# Patient Record
Sex: FEMALE | Race: BLACK OR AFRICAN AMERICAN | Employment: PART TIME | URBAN - METROPOLITAN AREA
[De-identification: names, ages, dates, MRNs, and addresses within clinical notes are randomized per-mention and may not be internally consistent; named-entity substitution may affect disease eponyms.]

---

## 2018-10-19 ENCOUNTER — APPOINTMENT (OUTPATIENT)
Dept: GENERAL RADIOLOGY | Age: 48
End: 2018-10-19
Payer: MEDICARE

## 2018-10-19 ENCOUNTER — HOSPITAL ENCOUNTER (EMERGENCY)
Age: 48
Discharge: HOME | End: 2018-10-19
Attending: EMERGENCY MEDICINE
Payer: MEDICARE

## 2018-10-19 VITALS
RESPIRATION RATE: 18 BRPM | OXYGEN SATURATION: 99 % | WEIGHT: 217.15 LBS | DIASTOLIC BLOOD PRESSURE: 78 MMHG | HEART RATE: 99 BPM | SYSTOLIC BLOOD PRESSURE: 139 MMHG | TEMPERATURE: 98.5 F

## 2018-10-19 DIAGNOSIS — J40 BRONCHITIS: Primary | ICD-10-CM

## 2018-10-19 LAB
A/G RATIO: 1.1 (ref 1.1–2.2)
ALBUMIN SERPL-MCNC: 3.9 G/DL (ref 3.4–5)
ALP BLD-CCNC: 66 U/L (ref 40–129)
ALT SERPL-CCNC: 10 U/L (ref 10–40)
ANION GAP SERPL CALCULATED.3IONS-SCNC: 14 MMOL/L (ref 3–16)
AST SERPL-CCNC: 20 U/L (ref 15–37)
BASOPHILS ABSOLUTE: 0.1 K/UL (ref 0–0.2)
BASOPHILS RELATIVE PERCENT: 1.5 %
BILIRUB SERPL-MCNC: <0.2 MG/DL (ref 0–1)
BUN BLDV-MCNC: 9 MG/DL (ref 7–20)
CALCIUM SERPL-MCNC: 9 MG/DL (ref 8.3–10.6)
CHLORIDE BLD-SCNC: 104 MMOL/L (ref 99–110)
CO2: 21 MMOL/L (ref 21–32)
CREAT SERPL-MCNC: 0.8 MG/DL (ref 0.6–1.1)
EOSINOPHILS ABSOLUTE: 0.6 K/UL (ref 0–0.6)
EOSINOPHILS RELATIVE PERCENT: 8.9 %
GFR AFRICAN AMERICAN: >60
GFR NON-AFRICAN AMERICAN: >60
GLOBULIN: 3.7 G/DL
GLUCOSE BLD-MCNC: 105 MG/DL (ref 70–99)
HCT VFR BLD CALC: 33.9 % (ref 36–48)
HEMOGLOBIN: 10.9 G/DL (ref 12–16)
LYMPHOCYTES ABSOLUTE: 2.6 K/UL (ref 1–5.1)
LYMPHOCYTES RELATIVE PERCENT: 41.2 %
MCH RBC QN AUTO: 24.5 PG (ref 26–34)
MCHC RBC AUTO-ENTMCNC: 32 G/DL (ref 31–36)
MCV RBC AUTO: 76.5 FL (ref 80–100)
MONOCYTES ABSOLUTE: 0.4 K/UL (ref 0–1.3)
MONOCYTES RELATIVE PERCENT: 6.3 %
NEUTROPHILS ABSOLUTE: 2.6 K/UL (ref 1.7–7.7)
NEUTROPHILS RELATIVE PERCENT: 42.1 %
PDW BLD-RTO: 18.5 % (ref 12.4–15.4)
PLATELET # BLD: 502 K/UL (ref 135–450)
PMV BLD AUTO: 7.4 FL (ref 5–10.5)
POTASSIUM SERPL-SCNC: 4 MMOL/L (ref 3.5–5.1)
RBC # BLD: 4.44 M/UL (ref 4–5.2)
SODIUM BLD-SCNC: 139 MMOL/L (ref 136–145)
TOTAL PROTEIN: 7.6 G/DL (ref 6.4–8.2)
WBC # BLD: 6.2 K/UL (ref 4–11)

## 2018-10-19 PROCEDURE — 96374 THER/PROPH/DIAG INJ IV PUSH: CPT

## 2018-10-19 PROCEDURE — 71045 X-RAY EXAM CHEST 1 VIEW: CPT

## 2018-10-19 PROCEDURE — 94664 DEMO&/EVAL PT USE INHALER: CPT

## 2018-10-19 PROCEDURE — 94640 AIRWAY INHALATION TREATMENT: CPT

## 2018-10-19 PROCEDURE — 99284 EMERGENCY DEPT VISIT MOD MDM: CPT

## 2018-10-19 PROCEDURE — 85025 COMPLETE CBC W/AUTO DIFF WBC: CPT

## 2018-10-19 PROCEDURE — 6370000000 HC RX 637 (ALT 250 FOR IP): Performed by: EMERGENCY MEDICINE

## 2018-10-19 PROCEDURE — 6360000002 HC RX W HCPCS: Performed by: EMERGENCY MEDICINE

## 2018-10-19 PROCEDURE — 80053 COMPREHEN METABOLIC PANEL: CPT

## 2018-10-19 PROCEDURE — 93005 ELECTROCARDIOGRAM TRACING: CPT | Performed by: EMERGENCY MEDICINE

## 2018-10-19 RX ORDER — PREDNISONE 20 MG/1
TABLET ORAL
Qty: 27 TABLET | Refills: 0 | Status: SHIPPED | OUTPATIENT
Start: 2018-10-19 | End: 2021-08-12

## 2018-10-19 RX ORDER — ALBUTEROL SULFATE 90 UG/1
2 AEROSOL, METERED RESPIRATORY (INHALATION) EVERY 4 HOURS PRN
Qty: 1 INHALER | Refills: 1 | Status: SHIPPED | OUTPATIENT
Start: 2018-10-19 | End: 2022-08-29

## 2018-10-19 RX ORDER — AZITHROMYCIN 500 MG/1
500 TABLET, FILM COATED ORAL ONCE
Status: COMPLETED | OUTPATIENT
Start: 2018-10-19 | End: 2018-10-19

## 2018-10-19 RX ORDER — IPRATROPIUM BROMIDE AND ALBUTEROL SULFATE 2.5; .5 MG/3ML; MG/3ML
1 SOLUTION RESPIRATORY (INHALATION) ONCE
Status: COMPLETED | OUTPATIENT
Start: 2018-10-19 | End: 2018-10-19

## 2018-10-19 RX ORDER — AZITHROMYCIN 250 MG/1
TABLET, FILM COATED ORAL
Qty: 4 TABLET | Refills: 0 | Status: SHIPPED | OUTPATIENT
Start: 2018-10-19 | End: 2020-06-03

## 2018-10-19 RX ORDER — DEXAMETHASONE SODIUM PHOSPHATE 4 MG/ML
10 INJECTION, SOLUTION INTRA-ARTICULAR; INTRALESIONAL; INTRAMUSCULAR; INTRAVENOUS; SOFT TISSUE ONCE
Status: COMPLETED | OUTPATIENT
Start: 2018-10-19 | End: 2018-10-19

## 2018-10-19 RX ADMIN — DEXAMETHASONE SODIUM PHOSPHATE 10 MG: 4 INJECTION, SOLUTION INTRAMUSCULAR; INTRAVENOUS at 01:24

## 2018-10-19 RX ADMIN — IPRATROPIUM BROMIDE AND ALBUTEROL SULFATE 1 AMPULE: .5; 3 SOLUTION RESPIRATORY (INHALATION) at 02:04

## 2018-10-19 RX ADMIN — AZITHROMYCIN 500 MG: 500 TABLET, FILM COATED ORAL at 02:57

## 2018-10-19 ASSESSMENT — PAIN SCALES - GENERAL: PAINLEVEL_OUTOF10: 0

## 2018-10-19 NOTE — ED PROVIDER NOTES
Inhale 2 puffs into the lungs every 4 hours as needed for Wheezing or Shortness of Breath With spacer (and mask if indicated). Thanks. 1 Inhaler 1    azithromycin (ZITHROMAX) 250 MG tablet 1 tablet daily starting tomorrow 4 tablet 0     No Known Allergies    Nursing Notes Reviewed    Physical Exam:  ED Triage Vitals   BP Temp Temp src Pulse Resp SpO2 Height Weight   -- -- -- -- -- -- -- --     GENERAL APPEARANCE: Awake and alert. Cooperative. Mild acute distress. HEAD:Normocephalic. Atraumatic. EYES: EOM's grossly intact. Sclera anicteric. ENT: Mucous membranes are moist. Tolerates saliva. No trismus. NECK: Supple. No meningismus. Trachea midline. HEART: RRR. LUNGS: Mild decreased breath sounds bilaterally, with slight exotropia wheezes bilaterally. No rhonchi. ABDOMEN: Soft. Non-tender. No guarding or rebound. EXTREMITIES: No acute deformities. SKIN: Warm and dry. NEUROLOGICAL: No gross facial drooping. Moves all 4 extremitiesspontaneously.     Physical Exam    I have reviewed and interpreted all of the currently availablelab results from this visit (if applicable):  Results for orders placed or performed during the hospital encounter of 10/19/18   CBC Auto Differential   Result Value Ref Range    WBC 6.2 4.0 - 11.0 K/uL    RBC 4.44 4.00 - 5.20 M/uL    Hemoglobin 10.9 (L) 12.0 - 16.0 g/dL    Hematocrit 33.9 (L) 36.0 - 48.0 %    MCV 76.5 (L) 80.0 - 100.0 fL    MCH 24.5 (L) 26.0 - 34.0 pg    MCHC 32.0 31.0 - 36.0 g/dL    RDW 18.5 (H) 12.4 - 15.4 %    Platelets 138 (H) 731 - 450 K/uL    MPV 7.4 5.0 - 10.5 fL    Neutrophils % 42.1 %    Lymphocytes % 41.2 %    Monocytes % 6.3 %    Eosinophils % 8.9 %    Basophils % 1.5 %    Neutrophils # 2.6 1.7 - 7.7 K/uL    Lymphocytes # 2.6 1.0 - 5.1 K/uL    Monocytes # 0.4 0.0 - 1.3 K/uL    Eosinophils # 0.6 0.0 - 0.6 K/uL    Basophils # 0.1 0.0 - 0.2 K/uL   Comprehensive Metabolic Panel   Result Value Ref Range    Sodium 139 136 - 145 mmol/L    Potassium 4.0 3.5 - 5.1

## 2018-10-20 PROCEDURE — 93010 ELECTROCARDIOGRAM REPORT: CPT | Performed by: INTERNAL MEDICINE

## 2018-10-22 LAB
EKG ATRIAL RATE: 127 BPM
EKG DIAGNOSIS: NORMAL
EKG P AXIS: 64 DEGREES
EKG P-R INTERVAL: 112 MS
EKG Q-T INTERVAL: 332 MS
EKG QRS DURATION: 66 MS
EKG QTC CALCULATION (BAZETT): 482 MS
EKG R AXIS: -3 DEGREES
EKG T AXIS: 37 DEGREES
EKG VENTRICULAR RATE: 127 BPM

## 2020-02-21 ENCOUNTER — HOSPITAL ENCOUNTER (EMERGENCY)
Age: 50
Discharge: HOME | End: 2020-02-21
Payer: MEDICARE

## 2020-02-21 ENCOUNTER — APPOINTMENT (OUTPATIENT)
Dept: GENERAL RADIOLOGY | Age: 50
End: 2020-02-21
Payer: MEDICARE

## 2020-02-21 VITALS
SYSTOLIC BLOOD PRESSURE: 184 MMHG | BODY MASS INDEX: 42.03 KG/M2 | DIASTOLIC BLOOD PRESSURE: 106 MMHG | HEART RATE: 102 BPM | RESPIRATION RATE: 17 BRPM | TEMPERATURE: 98.1 F | WEIGHT: 237.22 LBS | HEIGHT: 63 IN | OXYGEN SATURATION: 98 %

## 2020-02-21 PROCEDURE — 99283 EMERGENCY DEPT VISIT LOW MDM: CPT

## 2020-02-21 PROCEDURE — 73560 X-RAY EXAM OF KNEE 1 OR 2: CPT

## 2020-02-21 RX ORDER — MELOXICAM 15 MG/1
15 TABLET ORAL DAILY
Qty: 30 TABLET | Refills: 0 | Status: SHIPPED | OUTPATIENT
Start: 2020-02-21 | End: 2020-06-03

## 2020-02-21 ASSESSMENT — ENCOUNTER SYMPTOMS
ABDOMINAL PAIN: 0
VOMITING: 0
SORE THROAT: 0
NAUSEA: 0
COUGH: 0
COLOR CHANGE: 0
SHORTNESS OF BREATH: 0
WHEEZING: 0
BACK PAIN: 0
DIARRHEA: 0

## 2020-02-21 ASSESSMENT — PAIN DESCRIPTION - LOCATION: LOCATION: KNEE

## 2020-02-21 ASSESSMENT — PAIN DESCRIPTION - DESCRIPTORS: DESCRIPTORS: CONSTANT

## 2020-02-21 ASSESSMENT — PAIN SCALES - GENERAL: PAINLEVEL_OUTOF10: 9

## 2020-02-21 ASSESSMENT — PAIN - FUNCTIONAL ASSESSMENT: PAIN_FUNCTIONAL_ASSESSMENT: PREVENTS OR INTERFERES SOME ACTIVE ACTIVITIES AND ADLS

## 2020-02-21 ASSESSMENT — PAIN DESCRIPTION - ONSET: ONSET: ON-GOING

## 2020-02-21 ASSESSMENT — PAIN DESCRIPTION - ORIENTATION: ORIENTATION: LEFT

## 2020-02-21 ASSESSMENT — PAIN DESCRIPTION - FREQUENCY: FREQUENCY: CONTINUOUS

## 2020-02-21 ASSESSMENT — PAIN DESCRIPTION - PROGRESSION: CLINICAL_PROGRESSION: GRADUALLY WORSENING

## 2020-02-21 ASSESSMENT — PAIN DESCRIPTION - PAIN TYPE: TYPE: ACUTE PAIN

## 2020-02-22 NOTE — ED PROVIDER NOTES
chills and fever. HENT: Negative for congestion and sore throat. Respiratory: Negative for cough, shortness of breath and wheezing. Cardiovascular: Negative for chest pain. Gastrointestinal: Negative for abdominal pain, diarrhea, nausea and vomiting. Genitourinary: Negative for difficulty urinating and dysuria. Musculoskeletal: Positive for arthralgias. Negative for back pain. Patient complains of left knee pain that she woke up this morning with, denies any falls traumas or injuries. Ambulation makes the pain worse. Skin: Negative for color change. Neurological: Negative for weakness, numbness and headaches. Positives and Pertinent negatives as per HPI. Except as noted above in the ROS, problem specific ROS was completed and is negative. Physical Exam:  Physical Exam  Vitals signs and nursing note reviewed. Constitutional:       Appearance: She is well-developed. She is not diaphoretic. HENT:      Head: Normocephalic. Right Ear: External ear normal.      Left Ear: External ear normal.   Eyes:      General:         Right eye: No discharge. Left eye: No discharge. Neck:      Musculoskeletal: Normal range of motion and neck supple. Cardiovascular:      Rate and Rhythm: Regular rhythm. Pulmonary:      Effort: Pulmonary effort is normal. No respiratory distress. Breath sounds: Normal breath sounds. Abdominal:      Palpations: Abdomen is soft. Musculoskeletal: Normal range of motion. General: Tenderness present. Comments: Patient has reproducible tenderness to the subpatellar region of the left knee, no obvious deformity, break in skin integrity, erythema or warmth. She has full extension however flexion elicits pain. There is no palpable crepitus. Negative anterior drawer test.  Compartments of the left leg are soft. Pedal pulses are 2+. Skin:     General: Skin is warm. Capillary Refill: Capillary refill takes less than 2 seconds.

## 2020-06-03 ENCOUNTER — HOSPITAL ENCOUNTER (EMERGENCY)
Age: 50
Discharge: HOME | End: 2020-06-04
Attending: EMERGENCY MEDICINE
Payer: MEDICARE

## 2020-06-03 ENCOUNTER — APPOINTMENT (OUTPATIENT)
Dept: GENERAL RADIOLOGY | Age: 50
End: 2020-06-03
Payer: MEDICARE

## 2020-06-03 LAB
A/G RATIO: 1.2 (ref 1.1–2.2)
ALBUMIN SERPL-MCNC: 4.1 G/DL (ref 3.4–5)
ALP BLD-CCNC: 70 U/L (ref 40–129)
ALT SERPL-CCNC: 10 U/L (ref 10–40)
ANION GAP SERPL CALCULATED.3IONS-SCNC: 12 MMOL/L (ref 3–16)
AST SERPL-CCNC: 23 U/L (ref 15–37)
BASOPHILS ABSOLUTE: 0.1 K/UL (ref 0–0.2)
BASOPHILS RELATIVE PERCENT: 1 %
BILIRUB SERPL-MCNC: <0.2 MG/DL (ref 0–1)
BUN BLDV-MCNC: 14 MG/DL (ref 7–20)
CALCIUM SERPL-MCNC: 9.3 MG/DL (ref 8.3–10.6)
CHLORIDE BLD-SCNC: 105 MMOL/L (ref 99–110)
CO2: 21 MMOL/L (ref 21–32)
CREAT SERPL-MCNC: 0.7 MG/DL (ref 0.6–1.1)
D DIMER: <200 NG/ML DDU (ref 0–229)
EOSINOPHILS ABSOLUTE: 0.7 K/UL (ref 0–0.6)
EOSINOPHILS RELATIVE PERCENT: 9.6 %
GFR AFRICAN AMERICAN: >60
GFR NON-AFRICAN AMERICAN: >60
GLOBULIN: 3.3 G/DL
GLUCOSE BLD-MCNC: 127 MG/DL (ref 70–99)
HCG QUALITATIVE: NEGATIVE
HCT VFR BLD CALC: 36 % (ref 36–48)
HEMOGLOBIN: 11.6 G/DL (ref 12–16)
LYMPHOCYTES ABSOLUTE: 2 K/UL (ref 1–5.1)
LYMPHOCYTES RELATIVE PERCENT: 28.4 %
MCH RBC QN AUTO: 25.2 PG (ref 26–34)
MCHC RBC AUTO-ENTMCNC: 32.2 G/DL (ref 31–36)
MCV RBC AUTO: 78.2 FL (ref 80–100)
MONOCYTES ABSOLUTE: 0.5 K/UL (ref 0–1.3)
MONOCYTES RELATIVE PERCENT: 6.8 %
NEUTROPHILS ABSOLUTE: 3.7 K/UL (ref 1.7–7.7)
NEUTROPHILS RELATIVE PERCENT: 54.2 %
PDW BLD-RTO: 17.7 % (ref 12.4–15.4)
PLATELET # BLD: 428 K/UL (ref 135–450)
PMV BLD AUTO: 7.6 FL (ref 5–10.5)
POTASSIUM REFLEX MAGNESIUM: 5.1 MMOL/L (ref 3.5–5.1)
PRO-BNP: <5 PG/ML (ref 0–124)
RBC # BLD: 4.6 M/UL (ref 4–5.2)
SODIUM BLD-SCNC: 138 MMOL/L (ref 136–145)
TOTAL PROTEIN: 7.4 G/DL (ref 6.4–8.2)
TROPONIN: <0.01 NG/ML
WBC # BLD: 6.9 K/UL (ref 4–11)

## 2020-06-03 PROCEDURE — 85025 COMPLETE CBC W/AUTO DIFF WBC: CPT

## 2020-06-03 PROCEDURE — 85379 FIBRIN DEGRADATION QUANT: CPT

## 2020-06-03 PROCEDURE — 80053 COMPREHEN METABOLIC PANEL: CPT

## 2020-06-03 PROCEDURE — 93005 ELECTROCARDIOGRAM TRACING: CPT | Performed by: EMERGENCY MEDICINE

## 2020-06-03 PROCEDURE — 84443 ASSAY THYROID STIM HORMONE: CPT

## 2020-06-03 PROCEDURE — 99285 EMERGENCY DEPT VISIT HI MDM: CPT

## 2020-06-03 PROCEDURE — 36415 COLL VENOUS BLD VENIPUNCTURE: CPT

## 2020-06-03 PROCEDURE — 84484 ASSAY OF TROPONIN QUANT: CPT

## 2020-06-03 PROCEDURE — 83880 ASSAY OF NATRIURETIC PEPTIDE: CPT

## 2020-06-03 PROCEDURE — 84703 CHORIONIC GONADOTROPIN ASSAY: CPT

## 2020-06-03 ASSESSMENT — PAIN SCALES - GENERAL: PAINLEVEL_OUTOF10: 8

## 2020-06-04 ENCOUNTER — APPOINTMENT (OUTPATIENT)
Dept: GENERAL RADIOLOGY | Age: 50
End: 2020-06-04
Payer: MEDICARE

## 2020-06-04 VITALS
HEART RATE: 106 BPM | SYSTOLIC BLOOD PRESSURE: 138 MMHG | WEIGHT: 237 LBS | BODY MASS INDEX: 41.98 KG/M2 | DIASTOLIC BLOOD PRESSURE: 76 MMHG | RESPIRATION RATE: 20 BRPM | OXYGEN SATURATION: 98 % | TEMPERATURE: 98.8 F

## 2020-06-04 LAB
AMPHETAMINE SCREEN, URINE: ABNORMAL
BARBITURATE SCREEN URINE: ABNORMAL
BENZODIAZEPINE SCREEN, URINE: ABNORMAL
BILIRUBIN URINE: NEGATIVE
BLOOD, URINE: NEGATIVE
CANNABINOID SCREEN URINE: POSITIVE
CLARITY: CLEAR
COCAINE METABOLITE SCREEN URINE: ABNORMAL
COLOR: YELLOW
EKG ATRIAL RATE: 133 BPM
EKG DIAGNOSIS: NORMAL
EKG P AXIS: 45 DEGREES
EKG P-R INTERVAL: 118 MS
EKG Q-T INTERVAL: 306 MS
EKG QRS DURATION: 72 MS
EKG QTC CALCULATION (BAZETT): 455 MS
EKG R AXIS: -13 DEGREES
EKG T AXIS: 27 DEGREES
EKG VENTRICULAR RATE: 133 BPM
EPITHELIAL CELLS, UA: 6 /HPF (ref 0–5)
GLUCOSE URINE: NEGATIVE MG/DL
HYALINE CASTS: 5 /LPF (ref 0–8)
KETONES, URINE: ABNORMAL MG/DL
LEUKOCYTE ESTERASE, URINE: NEGATIVE
Lab: ABNORMAL
METHADONE SCREEN, URINE: ABNORMAL
MICROSCOPIC EXAMINATION: YES
NITRITE, URINE: NEGATIVE
OPIATE SCREEN URINE: ABNORMAL
OXYCODONE URINE: ABNORMAL
PH UA: 6
PH UA: 6 (ref 5–8)
PHENCYCLIDINE SCREEN URINE: ABNORMAL
PROPOXYPHENE SCREEN: ABNORMAL
PROTEIN UA: 30 MG/DL
RBC UA: 2 /HPF (ref 0–4)
SPECIFIC GRAVITY UA: 1.03 (ref 1–1.03)
TSH REFLEX: 2.33 UIU/ML (ref 0.27–4.2)
URINE REFLEX TO CULTURE: ABNORMAL
URINE TYPE: ABNORMAL
UROBILINOGEN, URINE: 1 E.U./DL
WBC UA: 5 /HPF (ref 0–5)

## 2020-06-04 PROCEDURE — 6370000000 HC RX 637 (ALT 250 FOR IP): Performed by: NURSE PRACTITIONER

## 2020-06-04 PROCEDURE — 93010 ELECTROCARDIOGRAM REPORT: CPT | Performed by: INTERNAL MEDICINE

## 2020-06-04 PROCEDURE — 71045 X-RAY EXAM CHEST 1 VIEW: CPT

## 2020-06-04 PROCEDURE — 81001 URINALYSIS AUTO W/SCOPE: CPT

## 2020-06-04 PROCEDURE — 80307 DRUG TEST PRSMV CHEM ANLYZR: CPT

## 2020-06-04 RX ORDER — HYDROXYZINE PAMOATE 25 MG/1
50 CAPSULE ORAL ONCE
Status: COMPLETED | OUTPATIENT
Start: 2020-06-04 | End: 2020-06-04

## 2020-06-04 RX ORDER — HYDROXYZINE HYDROCHLORIDE 25 MG/1
25 TABLET, FILM COATED ORAL EVERY 6 HOURS PRN
Qty: 20 TABLET | Refills: 0 | Status: SHIPPED | OUTPATIENT
Start: 2020-06-04 | End: 2020-06-14

## 2020-06-04 RX ADMIN — HYDROXYZINE PAMOATE 50 MG: 25 CAPSULE ORAL at 01:12

## 2020-06-04 NOTE — ED PROVIDER NOTES
[facility]        Pt Name: Audrey Lopez  MRN: [id number]  [place] 1970  Date of evaluation: 6/3/2020  Provider: JAMIE Arteaga - GEORGE  PCP: Marquise Romero MD     I have seen and evaluated this patient with my supervising physician Dr. Harsha Gonzáles       Chief Complaint   Patient presents with    Palpitations     pt states she started having palpitations and feeling \"a little nauseous\". pt with hx of anxiety states she has been stressed recently and thinks it may be anxiety. HISTORY OF PRESENT ILLNESS   (Location, Timing/Onset, Context/Setting, Quality, Duration, Modifying Factors, Severity, Associated Signs and Symptoms)  Note limiting factors. Audrey Lopez is a 48 y.o. female medical history of HTN, bronchitis and asthma who presents the ED with complaints of palpitations and tachycardia that occurred 1 hour prior to arrival.  Patient says she was watching a movie at the time and started to feel stressed and anxious. She was concerned because a friend had recently  from a PE and she started to think about it got upset. She denies any prior bleeding or clotting disorders, history of PE or DVT, family history of PE or DVT, long distance travels, surgeries, immobilizations, history of cancer. Patient does admit to smoking marijuana 4 hours ago. Says she was diagnosed with hypertension in the past, however has been noncompliant with her medicine as she was in denial.  She denies any history of any thyroid disease. She also has associated nausea she attributed to the anxiety. She is not anticoagulated. She denies associated vomiting, diarrhea, urinary symptoms, abdominal pain, short of air, headache, lightheaded, dizzy, leg swelling, and hemoptysis. Nursing Notes were all reviewed and agreed with or any disagreements were addressed in the HPI.     REVIEW OF SYSTEMS    (2-9 systems for level 4, 10 or more for level 5)     Review of Systems    Positives and Pertinent negatives as per HPI. Except as noted above in the ROS, all other systems were reviewed and negative. PAST MEDICAL HISTORY     Past Medical History:   Diagnosis Date    Asthma          SURGICAL HISTORY     Past Surgical History:   Procedure Laterality Date    ABDOMEN SURGERY           CURRENTMEDICATIONS       Discharge Medication List as of 6/4/2020  3:14 AM      CONTINUE these medications which have NOT CHANGED    Details   NONFORMULARY bp medication-does not know nameHistorical Med      predniSONE (DELTASONE) 20 MG tablet Take 3 tablets daily for 6 days, then 2 tablets daily for 3 days, then 1 tablets daily for 3 days, Disp-27 tablet, R-0Print      albuterol sulfate HFA (PROVENTIL HFA) 108 (90 Base) MCG/ACT inhaler Inhale 2 puffs into the lungs every 4 hours as needed for Wheezing or Shortness of Breath With spacer (and mask if indicated). Thanks. , Disp-1 Inhaler, R-1Print               ALLERGIES     Patient has no known allergies. FAMILYHISTORY     History reviewed. No pertinent family history. SOCIAL HISTORY       Social History     Tobacco Use    Smoking status: Never Smoker    Smokeless tobacco: Never Used   Substance Use Topics    Alcohol use: No    Drug use: Yes     Types: Marijuana       SCREENINGS             PHYSICAL EXAM    (up to 7 for level 4, 8 or more for level 5)     ED Triage Vitals [06/03/20 2216]   BP Temp Temp Source Pulse Resp SpO2 Height Weight   (!) 189/110 98.8 °F (37.1 °C) Oral 127 20 98 % -- 237 lb (107.5 kg)       Physical Exam  Vitals signs and nursing note reviewed. Constitutional:       General: She is awake. Appearance: Normal appearance. She is well-developed. She is obese. HENT:      Head: Normocephalic and atraumatic. Nose: Nose normal.   Eyes:      General:         Right eye: No discharge. Left eye: No discharge.    Neck:      Musculoskeletal: Normal [address]   Phone [phone]   D-DIMER, QUANTITATIVE    Narrative:     Performed at:  [facility] [address], [address]   Phone [phone]   URINE RT REFLEX TO CULTURE   TSH WITH REFLEX   URINE DRUG SCREEN       All other labs were within normal range or not returned as of this dictation. EKG: All EKG's are interpreted by the Emergency Department Physician in the absence of a cardiologist.  Please see their note for interpretation of EKG. RADIOLOGY:   Non-plain film images such as CT, Ultrasound and MRI are read by the radiologist. Plain radiographic images are visualized and preliminarily interpreted by the ED Provider with the below findings:        Interpretation per the Radiologist below, if available at the time of this note:    XR CHEST PORTABLE   Final Result   No radiographic evidence of acute pulmonary disease. No results found. PROCEDURES   Unless otherwise noted below, none     Procedures    CRITICAL CARE TIME   N/A    CONSULTS:  None      EMERGENCY DEPARTMENT COURSE and DIFFERENTIAL DIAGNOSIS/MDM:   Vitals:    Vitals:    06/03/20 2330 06/04/20 0030 06/04/20 0100 06/04/20 0230   BP: (!) 153/70 129/68 (!) 136/53 138/76   Pulse: 118 113 111 106   Resp:       Temp:       TempSrc:       SpO2: 98% 97% 96% 98%   Weight:           Patient was given the following medications:  Medications   hydrOXYzine (VISTARIL) capsule 50 mg (50 mg Oral Given 6/4/20 0112)       Pertinent Labs & Imaging studies reviewed. (See chart for details)   -  Patient seen and evaluated in the emergency department. -  Triage and nursing notes reviewed and incorporated. -  Old chart records reviewed and incorporated. -  Patient case discussed with attending physician,  Dr. Nhung Junior. They saw and examined patient.   -  Differential diagnosis includes:  Pneumonia, MI, anxiety, pneumothorax, pleurisy, ACS, CAD, muscle strain, dehydration, panic attack, PE, thyroid disorder, polysubstance abuse, verse COVID-19  -  Work-up included:  See above CXR, CBC, CMP, troponin, BNP, UA, hCG, d-dimer, TSH, UDS, EKG  -  ED treatment included:   Atarax  - Consults: None  -  Results discussed with patient. Labs show  CBC with hemoglobin 11.6, MCV 78.2, MCH 25.2, RDW 17.7, glucose 127. Troponin negative. BNP less than 5. hCG negative. D-dimer less than 200. TSH pending. UA shows trace ketones with 30 protein. UDS shows positive for cannabinoids. Pt was given strict return precautions. The patient is agreeable with plan of care and disposition.  -  Disposition:  None      FINAL IMPRESSION      1. Palpitations    2. Elevated blood pressure reading    3.  Stress reaction          DISPOSITION/PLAN   DISPOSITION        PATIENT REFERREDTO:  Melba Elmore MD  [address]  [phone]    Call in 2 days  As needed, If symptoms worsen    [facility] Emergency Department  [address] [place] [address]  [phone]  Go to   As needed      DISCHARGE MEDICATIONS:  Discharge Medication List as of 6/4/2020  3:14 AM      START taking these medications    Details   hydrOXYzine (ATARAX) 25 MG tablet Take 1 tablet by mouth every 6 hours as needed for Itching, Disp-20 tablet, R-0Print             DISCONTINUED MEDICATIONS:  Discharge Medication List as of 6/4/2020  3:14 AM      STOP taking these medications       meloxicam (MOBIC) 15 MG tablet Comments:   Reason for Stopping:         azithromycin (ZITHROMAX) 250 MG tablet Comments:   Reason for Stopping:                      (Please note that portions of this note were completed with a voice recognition program.  Efforts were made to edit the dictations but occasionally words are mis-transcribed.)    JAMIE Rushing CNP (electronically signed)            JAMIE Rushing CNP  06/04/20 3886

## 2020-06-05 ENCOUNTER — HOSPITAL ENCOUNTER (EMERGENCY)
Age: 50
Discharge: HOME | End: 2020-06-05
Attending: EMERGENCY MEDICINE
Payer: MEDICARE

## 2020-06-05 ENCOUNTER — APPOINTMENT (OUTPATIENT)
Dept: CT IMAGING | Age: 50
End: 2020-06-05
Payer: MEDICARE

## 2020-06-05 VITALS
TEMPERATURE: 98 F | OXYGEN SATURATION: 97 % | BODY MASS INDEX: 42.68 KG/M2 | HEART RATE: 100 BPM | HEIGHT: 64 IN | WEIGHT: 250 LBS | DIASTOLIC BLOOD PRESSURE: 87 MMHG | RESPIRATION RATE: 17 BRPM | SYSTOLIC BLOOD PRESSURE: 148 MMHG

## 2020-06-05 LAB
A/G RATIO: 1 (ref 1.1–2.2)
ALBUMIN SERPL-MCNC: 3.8 G/DL (ref 3.4–5)
ALP BLD-CCNC: 67 U/L (ref 40–129)
ALT SERPL-CCNC: 8 U/L (ref 10–40)
ANION GAP SERPL CALCULATED.3IONS-SCNC: 10 MMOL/L (ref 3–16)
AST SERPL-CCNC: 13 U/L (ref 15–37)
BASOPHILS ABSOLUTE: 0.1 K/UL (ref 0–0.2)
BASOPHILS RELATIVE PERCENT: 0.9 %
BILIRUB SERPL-MCNC: <0.2 MG/DL (ref 0–1)
BUN BLDV-MCNC: 13 MG/DL (ref 7–20)
CALCIUM SERPL-MCNC: 9.5 MG/DL (ref 8.3–10.6)
CHLORIDE BLD-SCNC: 105 MMOL/L (ref 99–110)
CO2: 23 MMOL/L (ref 21–32)
CREAT SERPL-MCNC: 0.6 MG/DL (ref 0.6–1.1)
EKG ATRIAL RATE: 128 BPM
EKG DIAGNOSIS: NORMAL
EKG P AXIS: 49 DEGREES
EKG P-R INTERVAL: 130 MS
EKG Q-T INTERVAL: 320 MS
EKG QRS DURATION: 76 MS
EKG QTC CALCULATION (BAZETT): 467 MS
EKG R AXIS: -9 DEGREES
EKG T AXIS: 20 DEGREES
EKG VENTRICULAR RATE: 128 BPM
EOSINOPHILS ABSOLUTE: 0.5 K/UL (ref 0–0.6)
EOSINOPHILS RELATIVE PERCENT: 8.2 %
GFR AFRICAN AMERICAN: >60
GFR NON-AFRICAN AMERICAN: >60
GLOBULIN: 3.8 G/DL
GLUCOSE BLD-MCNC: 112 MG/DL (ref 70–99)
HCG QUALITATIVE: NEGATIVE
HCT VFR BLD CALC: 36.1 % (ref 36–48)
HEMOGLOBIN: 11.8 G/DL (ref 12–16)
LYMPHOCYTES ABSOLUTE: 2 K/UL (ref 1–5.1)
LYMPHOCYTES RELATIVE PERCENT: 30.4 %
MCH RBC QN AUTO: 25.8 PG (ref 26–34)
MCHC RBC AUTO-ENTMCNC: 32.7 G/DL (ref 31–36)
MCV RBC AUTO: 78.9 FL (ref 80–100)
MONOCYTES ABSOLUTE: 0.4 K/UL (ref 0–1.3)
MONOCYTES RELATIVE PERCENT: 6.1 %
NEUTROPHILS ABSOLUTE: 3.5 K/UL (ref 1.7–7.7)
NEUTROPHILS RELATIVE PERCENT: 54.4 %
PDW BLD-RTO: 17.2 % (ref 12.4–15.4)
PLATELET # BLD: 417 K/UL (ref 135–450)
PMV BLD AUTO: 7.7 FL (ref 5–10.5)
POTASSIUM SERPL-SCNC: 4.4 MMOL/L (ref 3.5–5.1)
PRO-BNP: 8 PG/ML (ref 0–124)
RBC # BLD: 4.58 M/UL (ref 4–5.2)
SODIUM BLD-SCNC: 138 MMOL/L (ref 136–145)
TOTAL PROTEIN: 7.6 G/DL (ref 6.4–8.2)
TROPONIN: <0.01 NG/ML
WBC # BLD: 6.4 K/UL (ref 4–11)

## 2020-06-05 PROCEDURE — 96374 THER/PROPH/DIAG INJ IV PUSH: CPT

## 2020-06-05 PROCEDURE — 84703 CHORIONIC GONADOTROPIN ASSAY: CPT

## 2020-06-05 PROCEDURE — 2580000003 HC RX 258: Performed by: PHYSICIAN ASSISTANT

## 2020-06-05 PROCEDURE — 83880 ASSAY OF NATRIURETIC PEPTIDE: CPT

## 2020-06-05 PROCEDURE — 93010 ELECTROCARDIOGRAM REPORT: CPT | Performed by: INTERNAL MEDICINE

## 2020-06-05 PROCEDURE — 6360000004 HC RX CONTRAST MEDICATION: Performed by: PHYSICIAN ASSISTANT

## 2020-06-05 PROCEDURE — 93005 ELECTROCARDIOGRAM TRACING: CPT | Performed by: EMERGENCY MEDICINE

## 2020-06-05 PROCEDURE — 2580000003 HC RX 258: Performed by: EMERGENCY MEDICINE

## 2020-06-05 PROCEDURE — 84484 ASSAY OF TROPONIN QUANT: CPT

## 2020-06-05 PROCEDURE — 71260 CT THORAX DX C+: CPT

## 2020-06-05 PROCEDURE — 6360000002 HC RX W HCPCS: Performed by: PHYSICIAN ASSISTANT

## 2020-06-05 PROCEDURE — 99284 EMERGENCY DEPT VISIT MOD MDM: CPT

## 2020-06-05 PROCEDURE — 85025 COMPLETE CBC W/AUTO DIFF WBC: CPT

## 2020-06-05 PROCEDURE — 80053 COMPREHEN METABOLIC PANEL: CPT

## 2020-06-05 RX ORDER — FAMOTIDINE 20 MG/1
20 TABLET, FILM COATED ORAL 2 TIMES DAILY
Qty: 60 TABLET | Refills: 0 | Status: SHIPPED | OUTPATIENT
Start: 2020-06-05

## 2020-06-05 RX ORDER — 0.9 % SODIUM CHLORIDE 0.9 %
1000 INTRAVENOUS SOLUTION INTRAVENOUS ONCE
Status: COMPLETED | OUTPATIENT
Start: 2020-06-05 | End: 2020-06-05

## 2020-06-05 RX ORDER — LORAZEPAM 2 MG/ML
1 INJECTION INTRAMUSCULAR ONCE
Status: COMPLETED | OUTPATIENT
Start: 2020-06-05 | End: 2020-06-05

## 2020-06-05 RX ADMIN — SODIUM CHLORIDE 1000 ML: 9 INJECTION, SOLUTION INTRAVENOUS at 10:30

## 2020-06-05 RX ADMIN — IOPAMIDOL 75 ML: 755 INJECTION, SOLUTION INTRAVENOUS at 09:31

## 2020-06-05 RX ADMIN — LORAZEPAM 1 MG: 2 INJECTION INTRAMUSCULAR; INTRAVENOUS at 08:41

## 2020-06-05 RX ADMIN — SODIUM CHLORIDE 1000 ML: 9 INJECTION, SOLUTION INTRAVENOUS at 08:40

## 2020-06-05 ASSESSMENT — ENCOUNTER SYMPTOMS
RHINORRHEA: 0
DIARRHEA: 0
ABDOMINAL PAIN: 0
SHORTNESS OF BREATH: 1
NAUSEA: 0
VOMITING: 0
COUGH: 0

## 2020-06-05 ASSESSMENT — PAIN SCALES - GENERAL: PAINLEVEL_OUTOF10: 5

## 2020-06-05 NOTE — ED PROVIDER NOTES
[facility]        Pt Name: Sindi Willard  MRN: [id number]  [place] 1970  Date of evaluation: 6/5/2020  Provider: Anju Emmanuel PA-C  PCP: Perla Conroy MD     I have seen and evaluated this patient with my supervising physician Murphy Mason, *. CHIEF COMPLAINT       Chief Complaint   Patient presents with    Chest Pain     states she woke with pain under left breast around 0720 today. hr 129 and bp 210/90 on arrival.  States she hasn't been taking her bp meds regularly. Here a few days ago for similar symptoms. HISTORY OF PRESENT ILLNESS   (Location, Timing/Onset, Context/Setting, Quality, Duration, Modifying Factors, Severity, Associated Signs and Symptoms)  Note limiting factors. Sindi Willard is a 48 y.o. female who presents to the emergency department today for evaluation for palpitations, chest pain and shortness of breath. The patient was actually seen in the emergency room 2 days ago for similar symptoms. The patient is very tearful when she arrives to the ED today she states that she is under a lot of stress and she is going through a lot. She tells me that she recently had a young friend die of a pulmonary embolism, and the patient is concerned that she could have 1 of these. In review of the patient's chart, her d-dimer was negative, her troponin was negative. The patient was given a prescription for Vistaril. The patient states that she has had a lot on her mind, and she states that she has been worried quite a bit. She feels that this is likely the cause of her symptoms. She states that earlier this morning she was lying in bed and she had a cramping sensation to her left upper chest, with associated palpitations and shortness of breath. The patient states that the pain has since resolved, and she states that all of her pain occurred while lying in bed.   The patient has a history of for evaluation for palpitations, chest pain and shortness of breath. The patient was actually seen in the emergency room 2 days ago for similar symptoms. The patient is very tearful when she arrives to the ED today she states that she is under a lot of stress and she is going through a lot. She tells me that she recently had a young friend die of a pulmonary embolism, and the patient is concerned that she could have 1 of these. In review of the patient's chart, her d-dimer was negative, her troponin was negative. The patient was given a prescription for Vistaril. The patient states that she has had a lot on her mind, and she states that she has been worried quite a bit. She feels that this is likely the cause of her symptoms. She states that earlier this morning she was lying in bed and she had a cramping sensation to her left upper chest, with associated palpitations and shortness of breath. The patient states that the pain has since resolved, and she states that all of her pain occurred while lying in bed. The patient has a history of hypertension although she did not take her medications today. She has no history of hyperlipidemia or diabetes. Non-smoker. She does smoke marijuana occasionally. Patient states that she believes that her mother had a blockage in her heart but she is unsure. The patient denies any history of DVT or PE. No estrogen therapies. She states that she has been having some intermittent leg cramping but denies any leg cramping to me at this time. She is no lower leg pain or swelling at this time. She tells me that she recently had a car ride to [place] and [facility]. She is not had any illnesses including fever, cough, vomiting or diarrhea. No urinary symptoms. The patient is very tearful, and she feels that most of her symptoms today are due to stress.     Physical exam, she is very tearful, does appear to be anxious on exam.  She is tachycardic with a rate of 121 of my

## 2020-06-05 NOTE — ED NOTES
Pt alert and oriented, Pt to ER via squad with c/o sudden CP and palpitations while sleeping last night, states was here just a few days ago for the same exact thing, states son called 26/ pt denies cardiac hx, states was recently started on BP meds for hypertension but does not take them. Pt states \"I swear this feels like anxiety. \" pt very anxious, tearful and requesting writer stay at bedside because \"I feel so much better when i'm not alone. \" Pt tachycardic, heart rate regular, S1, S2 heard. Cap refill less than three seconds, radial pulse 2+, no signs of edema or JVD and lungs sounds clear. Pt states pain 5/10 at this time. Pt denies any other problems or needs at this time.      Lyssa Abreu RN  06/05/20 [id number]

## 2020-06-05 NOTE — ED NOTES
Pt updated on plan of care by heather mccarthy. Patient resting comfortably with no signs of distress. Denies any needs at this time. Bed locked and in lowest position with both side rails raised. Call light within reach.      Tato Ritchie RN  06/05/20 3838

## 2020-06-05 NOTE — ED PROVIDER NOTES
perfusion, no edema    Medical Decision Making and Plan: Briefly, this is an 48 y. o.female who presented with chest discomfort. Reports anxiety, stress over death of her friend. The patient's history and evaluation suggests a less emergent etiology for the discomfort. We will initiate pepcid for esophagitis. We do not believe the patient is experiencing symptoms from acute coronary syndrome, aortic dissection, pulmonary embolism, pneumothorax, myocarditis, Boerhaave syndrome, pericardial tamponade, acute abdomen, amongst other emergencies. Dorethia Favre was given appropriate discharge instructions. Referral to follow up provider. For further details of [facility] encounter, please see documentation by advanced practice provider MACKENZIE Leon.     Labs Reviewed   CBC WITH AUTO DIFFERENTIAL - Abnormal; Notable for the following components:       Result Value    Hemoglobin 11.8 (*)     MCV 78.9 (*)     MCH 25.8 (*)     RDW 17.2 (*)     All other components within normal limits    Narrative:     Performed at:  [facility] [address],  [facility], [address]   Phone [phone]   COMPREHENSIVE METABOLIC PANEL - Abnormal; Notable for the following components:    Glucose 112 (*)     Albumin/Globulin Ratio 1.0 (*)     ALT 8 (*)     AST 13 (*)     All other components within normal limits    Narrative:     Performed at:  [facility] [address],  [facility], [address]   Phone [phone]   TROPONIN    Narrative:     Performed at:  [facility] [address],  [facility], [address]   Phone [phone]    Narrative:     Performed at:  [facility] [address],  [facility], [address]   Phone [phone]   HCG, SERUM, QUALITATIVE    Narrative:     Performed at:  [facility]  [address]

## 2020-07-03 ENCOUNTER — APPOINTMENT (OUTPATIENT)
Dept: CT IMAGING | Age: 50
End: 2020-07-03
Payer: MEDICARE

## 2020-07-03 ENCOUNTER — APPOINTMENT (OUTPATIENT)
Dept: GENERAL RADIOLOGY | Age: 50
End: 2020-07-03
Payer: MEDICARE

## 2020-07-03 ENCOUNTER — HOSPITAL ENCOUNTER (EMERGENCY)
Age: 50
Discharge: HOME | End: 2020-07-03
Payer: MEDICARE

## 2020-07-03 VITALS
HEART RATE: 98 BPM | SYSTOLIC BLOOD PRESSURE: 138 MMHG | BODY MASS INDEX: 39.05 KG/M2 | RESPIRATION RATE: 17 BRPM | OXYGEN SATURATION: 100 % | WEIGHT: 227.51 LBS | DIASTOLIC BLOOD PRESSURE: 76 MMHG | TEMPERATURE: 98 F

## 2020-07-03 LAB
A/G RATIO: 1 (ref 1.1–2.2)
ALBUMIN SERPL-MCNC: 4.5 G/DL (ref 3.4–5)
ALP BLD-CCNC: 84 U/L (ref 40–129)
ALT SERPL-CCNC: 8 U/L (ref 10–40)
ANION GAP SERPL CALCULATED.3IONS-SCNC: 15 MMOL/L (ref 3–16)
AST SERPL-CCNC: 16 U/L (ref 15–37)
BASOPHILS ABSOLUTE: 0.1 K/UL (ref 0–0.2)
BASOPHILS RELATIVE PERCENT: 1 %
BILIRUB SERPL-MCNC: 0.4 MG/DL (ref 0–1)
BILIRUBIN URINE: NEGATIVE
BLOOD, URINE: NEGATIVE
BUN BLDV-MCNC: 14 MG/DL (ref 7–20)
CALCIUM SERPL-MCNC: 9.7 MG/DL (ref 8.3–10.6)
CHLORIDE BLD-SCNC: 94 MMOL/L (ref 99–110)
CLARITY: ABNORMAL
CO2: 20 MMOL/L (ref 21–32)
COLOR: YELLOW
CREAT SERPL-MCNC: 0.9 MG/DL (ref 0.6–1.1)
EOSINOPHILS ABSOLUTE: 0.1 K/UL (ref 0–0.6)
EOSINOPHILS RELATIVE PERCENT: 1.3 %
EPITHELIAL CELLS, UA: 12 /HPF (ref 0–5)
GFR AFRICAN AMERICAN: >60
GFR NON-AFRICAN AMERICAN: >60
GLOBULIN: 4.5 G/DL
GLUCOSE BLD-MCNC: 96 MG/DL (ref 70–99)
GLUCOSE URINE: NEGATIVE MG/DL
HCG(URINE) PREGNANCY TEST: NEGATIVE
HCT VFR BLD CALC: 41.8 % (ref 36–48)
HEMOGLOBIN: 13.7 G/DL (ref 12–16)
HYALINE CASTS: 19 /LPF (ref 0–8)
KETONES, URINE: ABNORMAL MG/DL
LEUKOCYTE ESTERASE, URINE: NEGATIVE
LIPASE: 32 U/L (ref 13–60)
LYMPHOCYTES ABSOLUTE: 2.4 K/UL (ref 1–5.1)
LYMPHOCYTES RELATIVE PERCENT: 22.7 %
MCH RBC QN AUTO: 26.2 PG (ref 26–34)
MCHC RBC AUTO-ENTMCNC: 32.8 G/DL (ref 31–36)
MCV RBC AUTO: 79.7 FL (ref 80–100)
MICROSCOPIC EXAMINATION: YES
MONOCYTES ABSOLUTE: 0.7 K/UL (ref 0–1.3)
MONOCYTES RELATIVE PERCENT: 6.9 %
NEUTROPHILS ABSOLUTE: 7.3 K/UL (ref 1.7–7.7)
NEUTROPHILS RELATIVE PERCENT: 68.1 %
NITRITE, URINE: NEGATIVE
PDW BLD-RTO: 16.2 % (ref 12.4–15.4)
PH UA: 6 (ref 5–8)
PLATELET # BLD: 523 K/UL (ref 135–450)
PMV BLD AUTO: 7.5 FL (ref 5–10.5)
POTASSIUM REFLEX MAGNESIUM: 3.9 MMOL/L (ref 3.5–5.1)
PROTEIN UA: 100 MG/DL
RBC # BLD: 5.25 M/UL (ref 4–5.2)
RBC UA: 3 /HPF (ref 0–4)
SODIUM BLD-SCNC: 129 MMOL/L (ref 136–145)
SPECIFIC GRAVITY UA: 1.02 (ref 1–1.03)
TOTAL PROTEIN: 9 G/DL (ref 6.4–8.2)
TROPONIN: <0.01 NG/ML
URINE REFLEX TO CULTURE: ABNORMAL
URINE TYPE: ABNORMAL
UROBILINOGEN, URINE: 0.2 E.U./DL
WBC # BLD: 10.7 K/UL (ref 4–11)
WBC UA: 7 /HPF (ref 0–5)

## 2020-07-03 PROCEDURE — 83690 ASSAY OF LIPASE: CPT

## 2020-07-03 PROCEDURE — 93005 ELECTROCARDIOGRAM TRACING: CPT | Performed by: PHYSICIAN ASSISTANT

## 2020-07-03 PROCEDURE — 71260 CT THORAX DX C+: CPT

## 2020-07-03 PROCEDURE — 99284 EMERGENCY DEPT VISIT MOD MDM: CPT

## 2020-07-03 PROCEDURE — 81001 URINALYSIS AUTO W/SCOPE: CPT

## 2020-07-03 PROCEDURE — 80053 COMPREHEN METABOLIC PANEL: CPT

## 2020-07-03 PROCEDURE — 6360000004 HC RX CONTRAST MEDICATION: Performed by: PHYSICIAN ASSISTANT

## 2020-07-03 PROCEDURE — 71046 X-RAY EXAM CHEST 2 VIEWS: CPT

## 2020-07-03 PROCEDURE — 2580000003 HC RX 258: Performed by: PHYSICIAN ASSISTANT

## 2020-07-03 PROCEDURE — 96361 HYDRATE IV INFUSION ADD-ON: CPT

## 2020-07-03 PROCEDURE — 84703 CHORIONIC GONADOTROPIN ASSAY: CPT

## 2020-07-03 PROCEDURE — 96360 HYDRATION IV INFUSION INIT: CPT

## 2020-07-03 PROCEDURE — 84484 ASSAY OF TROPONIN QUANT: CPT

## 2020-07-03 PROCEDURE — 85025 COMPLETE CBC W/AUTO DIFF WBC: CPT

## 2020-07-03 RX ORDER — 0.9 % SODIUM CHLORIDE 0.9 %
1000 INTRAVENOUS SOLUTION INTRAVENOUS ONCE
Status: COMPLETED | OUTPATIENT
Start: 2020-07-03 | End: 2020-07-03

## 2020-07-03 RX ADMIN — SODIUM CHLORIDE 1000 ML: 9 INJECTION, SOLUTION INTRAVENOUS at 19:58

## 2020-07-03 RX ADMIN — SODIUM CHLORIDE 1000 ML: 9 INJECTION, SOLUTION INTRAVENOUS at 18:36

## 2020-07-03 RX ADMIN — IOPAMIDOL 75 ML: 755 INJECTION, SOLUTION INTRAVENOUS at 19:46

## 2020-07-03 ASSESSMENT — ENCOUNTER SYMPTOMS
VOMITING: 0
SHORTNESS OF BREATH: 0
CHEST TIGHTNESS: 0
ABDOMINAL PAIN: 1
NAUSEA: 0
DIARRHEA: 0

## 2020-07-03 ASSESSMENT — PAIN DESCRIPTION - ONSET: ONSET: ON-GOING

## 2020-07-03 ASSESSMENT — PAIN SCALES - GENERAL: PAINLEVEL_OUTOF10: 5

## 2020-07-03 ASSESSMENT — PAIN DESCRIPTION - PROGRESSION: CLINICAL_PROGRESSION: NOT CHANGED

## 2020-07-03 ASSESSMENT — PAIN DESCRIPTION - LOCATION: LOCATION: BACK;ABDOMEN

## 2020-07-03 ASSESSMENT — PAIN - FUNCTIONAL ASSESSMENT: PAIN_FUNCTIONAL_ASSESSMENT: ACTIVITIES ARE NOT PREVENTED

## 2020-07-03 ASSESSMENT — PAIN DESCRIPTION - PAIN TYPE: TYPE: ACUTE PAIN

## 2020-07-03 ASSESSMENT — PAIN DESCRIPTION - DESCRIPTORS: DESCRIPTORS: CRAMPING

## 2020-07-03 ASSESSMENT — PAIN DESCRIPTION - ORIENTATION: ORIENTATION: LOWER;MID

## 2020-07-03 ASSESSMENT — PAIN DESCRIPTION - FREQUENCY: FREQUENCY: INTERMITTENT

## 2020-07-03 NOTE — ED TRIAGE NOTES
Pt states she was at work and started having cramps/geovanny horses in her abdomen, she has a hx of this but this time was more painful, she states she had to lie down from the pain. Denies NVD.

## 2020-07-03 NOTE — ED PROVIDER NOTES
EKG interpretation    Sinus tachycardia rate of 110 age-indeterminate inferior infarct otherwise abnormal EKG          Jimbo Tamez MD  07/03/20 3441

## 2020-07-04 NOTE — ED PROVIDER NOTES
[facility] EMERGENCY DEPARTMENT  [address] [address]  Dept: [phone]  Loc: [phone]  eMERGENCYdEPARTMENT eNCOUnter      Pt Name: Jamia Nevarez  MRN: [id number]  [place] 1970  Date of evaluation: 7/3/2020  Provider:Inga Claire PA-C    CHIEF COMPLAINT       Chief Complaint   Patient presents with    Abdominal Cramping     Pt states she was at work and started having cramps/geovanny horses in her abdomen, she has a hx of this but this time was more painful, she states she had to lie down from the pain. Denies NVD. HISTORY OF PRESENT ILLNESS  (Location/Symptom, Timing/Onset, Context/Setting, Quality, Duration,Modifying Factors, Severity.)   Jamia Nevarez is a 48 y.o. female who presents to the emergency department by EMS complaining of cramping. Patient states at work today she began having cramping and spasming throughout abdomen. Spasms will move to various places throughout her abdomen. States as she began to have spasms she laid on the floor at work. Ice was applied to the affected regions with improvement. Patient was initially able to continue to work, but then symptoms worsened. She began to have increasing spasms throughout her lower abdomen. At this time pain became unbearable. Patient states pain was causing her to feel slightly lightheaded. EMS was called at that time. Patient denies any chest pain, shortness of breath, syncope. Denies any nausea, vomiting, diarrhea. She has a past medical history of asthma and anxiety. No other complaints this time. Nursing Notes were reviewedand agreed with or any disagreements were addressed in the HPI. REVIEW OF SYSTEMS    (2-9 systems for level 4, 10 or more for level 5)     Review of Systems   Constitutional: Negative for chills and fever. HENT: Negative. Respiratory: Negative for chest tightness and shortness of breath.     Cardiovascular: Positive for palpitations. Negative for chest pain. Gastrointestinal: Positive for abdominal pain. Negative for diarrhea, nausea and vomiting. Genitourinary: Negative. Musculoskeletal: Positive for myalgias. Negative for arthralgias. Neurological: Negative for dizziness and headaches. Psychiatric/Behavioral: Negative for behavioral problems and confusion. Except as noted above the remainder of the review of systems was reviewed and negative. PAST MEDICAL HISTORY         Diagnosis Date    Asthma        SURGICAL HISTORY           Procedure Laterality Date    ABDOMEN SURGERY         CURRENT MEDICATIONS     [unfilled]    ALLERGIES     Patient has no known allergies. FAMILY HISTORY     History reviewed. No pertinent family history. No family status information on file. SOCIAL HISTORY      reports that she has never smoked. She has never used smokeless tobacco. She reports current drug use. Drug: Marijuana. She reports that she does not drink alcohol. PHYSICAL EXAM    (up to 7 for level 4, 8 or more for level 5)     ED Triage Vitals [07/03/20 1714]   Enc Vitals Group      BP (!) 140/74      Pulse 111      Resp 20      Temp 98.9 °F (37.2 °C)      Temp Source Oral      SpO2 99 %      Weight 227 lb 8.2 oz (103.2 kg)      Height       Head Circumference       Peak Flow       Pain Score       Pain Loc       Pain Edu? Excl. in [address]? Physical Exam  Vitals signs reviewed. Constitutional:       Comments: Uncomfortable appearing   HENT:      Head: Normocephalic and atraumatic. Neck:      Musculoskeletal: Normal range of motion and neck supple. Cardiovascular:      Rate and Rhythm: Regular rhythm. Tachycardia present. Pulmonary:      Effort: Pulmonary effort is normal. No respiratory distress. Breath sounds: Normal breath sounds. Abdominal:      Palpations: Abdomen is soft. Tenderness: There is no abdominal tenderness. There is no guarding or rebound.    Musculoskeletal: Normal range of motion. Skin:     General: Skin is warm. Neurological:      General: No focal deficit present. Mental Status: She is alert and oriented to person, place, and time. Psychiatric:         Mood and Affect: Mood normal.         Behavior: Behavior normal.           DIAGNOSTIC RESULTS     EKG: All EKG's are interpreted by the Emergency Department Physician who either signs or Co-signs this chart in the absence of a cardiologist.    RADIOLOGY:   Non-plain film images such as CT, Ultrasound and MRI are read by the radiologist. Plain radiographic images are visualized and preliminarilyinterpreted by the emergency physician with the below findings:    Interpretation per the Radiologist below,if available at the time of this note:    CT CHEST PULMONARY EMBOLISM W CONTRAST   Final Result   Limited evaluation of the segmental and subsegmental pulmonary arterial   branches. No gross central pulmonary embolism is detected. There is again noted to be moderate to marked mural thickening of the mid to   lower esophagus, which again suggests esophagitis. Overall, that is similar   to decreased when compared to the previous exam.  That should be followed to   resolution. Multiple low-density lesions within the spleen, nonspecific. Correlate with   any clinical history of neoplasm. If there is a history of neoplasm, further   evaluation with PET-CT or MRI is recommended. There is no history of   neoplasm follow-up MRI in 6-12 months is recommended to ensure stability.          XR CHEST STANDARD (2 VW)   Final Result   No evidence of acute cardiopulmonary disease               LABS:  Labs Reviewed   CBC WITH AUTO DIFFERENTIAL - Abnormal; Notable for the following components:       Result Value    RBC 5.25 (*)     MCV 79.7 (*)     RDW 16.2 (*)     Platelets 950 (*)     All other components within normal limits    Narrative:     Performed at:  [facility]  [facility] [place]   Phone [phone]   COMPREHENSIVE METABOLIC PANEL W/ REFLEX TO MG FOR LOW K - Abnormal; Notable for the following components:    Sodium 129 (*)     Chloride 94 (*)     CO2 20 (*)     Total Protein 9.0 (*)     Albumin/Globulin Ratio 1.0 (*)     ALT 8 (*)     All other components within normal limits    Narrative:     Performed at:  [facility]  [address] [facility] [place]   Phone [phone]   URINE RT REFLEX TO CULTURE - Abnormal; Notable for the following components:    Clarity, UA CLOUDY (*)     Ketones, Urine TRACE (*)     Protein,  (*)     All other components within normal limits    Narrative:     Performed at:  [address] [place]   Phone [phone]   MICROSCOPIC URINALYSIS - Abnormal; Notable for the following components:    Hyaline Casts, UA 19 (*)     WBC, UA 7 (*)     Epithelial Cells, UA 12 (*)     All other components within normal limits    Narrative:     Performed at:  [facility]  [address] [facility] [place]   Phone [phone]   LIPASE    Narrative:     Performed at:  [facility]  [address] [place] [address] [place]   Phone [phone]   PREGNANCY, URINE    Narrative:     Performed at:  [address] [facility] [place]   Phone [phone]   TROPONIN    Narrative:     Performed at:  [facility]  [address] [place] [address] [facility]   Phone [phone]       All other labs were within normal range or not returned as of this dictation.     EMERGENCY DEPARTMENT COURSE and DIFFERENTIAL DIAGNOSIS/MDM:   Vitals:    Vitals:    07/03/20 1714 07/03/20 1845 07/03/20 2108 07/03/20 2130   BP: (!) 140/74 (!) 162/89  138/76   Pulse: 111 106 101 98   Resp: 20   17 Temp: 98.9 °F (37.2 °C)   98 °F (36.7 °C)   TempSrc: Oral   Oral   SpO2: 99% 99%  100%   Weight: 227 lb 8.2 oz (103.2 kg)          MDM     Patient presents ED with HPI noted above. Patient tachycardic and uncomfortable appearing upon arrival.    Physical exam as above. Regarding cramping, CMP showed mild hyponatremia with a sodium 129, CO2 20, no potassium or calcium abnormality. Again as discussed cramping resolved with IV fluid. In re-discussion with patient, this has been an ongoing issue for over 10 years. Discussed with patient I do not have a definitive answer regarding cramping and spasms at this time. Encourage patient to continue hydrating at home. Urine did show trace ketones. She voiced understanding. Imaging held of abdomen is abdomen soft nontender throughout and this has been ongoing issue for 10 years. Patient initially extremely uncomfortable given cramping. It was difficult to obtain a thorough HPI at initial evaluation given discomfort. She is given 1 L IV fluid. At reevaluation she voiced complete resolution of pain. Patient no longer having cramping which is moving throughout her body. Rediscussed HPI at this time. Patient had cramping scattered throughout her abdomen as well as her left lower leg. At no point did she have any chest pain or true shortness of breath. Given initial presentation, tachycardia and discomfort did do a CTA of chest.  CTA showed no central PE. After rediscussion with patient I do not suspect pulmonary embolism. She has no hx of PE/DVT, no calf pain or swelling, no hemoptysis. Patient has had tachycardia documented in all previous evaluations in the past several months. Again she has not been hypoxic throughout duration of stay in the ED here and has no shortness of breath or chest pain. She is had some intermittent palpitations, denies any currently. She was initially extremely anxious, has struggled with anxiety.   She denies being worked up a voice recognition program.  Efforts were made to edit the dictations but occasionally words are mis-transcribed.)    [address], PA-C         [facility] [facility]  07/03/20 7130

## 2020-07-06 LAB
EKG ATRIAL RATE: 110 BPM
EKG DIAGNOSIS: NORMAL
EKG P AXIS: 59 DEGREES
EKG P-R INTERVAL: 146 MS
EKG Q-T INTERVAL: 328 MS
EKG QRS DURATION: 64 MS
EKG QTC CALCULATION (BAZETT): 443 MS
EKG R AXIS: -17 DEGREES
EKG T AXIS: -1 DEGREES
EKG VENTRICULAR RATE: 110 BPM

## 2020-07-06 PROCEDURE — 93010 ELECTROCARDIOGRAM REPORT: CPT | Performed by: INTERNAL MEDICINE

## 2021-01-12 ENCOUNTER — HOSPITAL ENCOUNTER (EMERGENCY)
Age: 51
Discharge: HOME | End: 2021-01-12
Payer: MEDICARE

## 2021-01-12 ENCOUNTER — APPOINTMENT (OUTPATIENT)
Dept: GENERAL RADIOLOGY | Age: 51
End: 2021-01-12
Payer: MEDICARE

## 2021-01-12 VITALS
OXYGEN SATURATION: 100 % | SYSTOLIC BLOOD PRESSURE: 176 MMHG | RESPIRATION RATE: 16 BRPM | DIASTOLIC BLOOD PRESSURE: 101 MMHG | BODY MASS INDEX: 39.05 KG/M2 | HEART RATE: 110 BPM | TEMPERATURE: 98.1 F | HEIGHT: 64 IN

## 2021-01-12 DIAGNOSIS — R00.0 TACHYCARDIA: ICD-10-CM

## 2021-01-12 DIAGNOSIS — I10 UNCONTROLLED HYPERTENSION: ICD-10-CM

## 2021-01-12 DIAGNOSIS — S43.101A AC SEPARATION, RIGHT, INITIAL ENCOUNTER: Primary | ICD-10-CM
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
## 2021-01-12 PROCEDURE — 6370000000 HC RX 637 (ALT 250 FOR IP): Performed by: PHYSICIAN ASSISTANT

## 2021-01-12 PROCEDURE — 73130 X-RAY EXAM OF HAND: CPT

## 2021-01-12 PROCEDURE — 99284 EMERGENCY DEPT VISIT MOD MDM: CPT

## 2021-01-12 PROCEDURE — 73030 X-RAY EXAM OF SHOULDER: CPT

## 2021-01-12 RX ORDER — ACETAMINOPHEN 500 MG
1000 TABLET ORAL
Qty: 30 TABLET | Refills: 0 | Status: SHIPPED | OUTPATIENT
Start: 2021-01-12

## 2021-01-12 RX ORDER — METOPROLOL SUCCINATE 25 MG/1
25 TABLET, EXTENDED RELEASE ORAL DAILY
Status: COMPLETED | OUTPATIENT
Start: 2021-01-12 | End: 2021-01-12

## 2021-01-12 RX ORDER — ACETAMINOPHEN 500 MG
1000 TABLET ORAL ONCE
Status: COMPLETED | OUTPATIENT
Start: 2021-01-12 | End: 2021-01-12

## 2021-01-12 RX ORDER — HYDROCODONE BITARTRATE AND ACETAMINOPHEN 5; 325 MG/1; MG/1
1 TABLET ORAL EVERY 6 HOURS PRN
Qty: 10 TABLET | Refills: 0 | Status: SHIPPED | OUTPATIENT
Start: 2021-01-12 | End: 2021-01-16

## 2021-01-12 RX ORDER — METOPROLOL SUCCINATE 50 MG/1
50 TABLET, EXTENDED RELEASE ORAL DAILY
Qty: 30 TABLET | Refills: 0 | Status: SHIPPED | OUTPATIENT
Start: 2021-01-12

## 2021-01-12 RX ORDER — METOPROLOL SUCCINATE 25 MG/1
25 TABLET, EXTENDED RELEASE ORAL DAILY
Status: DISCONTINUED | OUTPATIENT
Start: 2021-01-13 | End: 2021-01-12

## 2021-01-12 RX ORDER — IBUPROFEN 600 MG/1
600 TABLET ORAL
Qty: 30 TABLET | Refills: 0 | OUTPATIENT
Start: 2021-01-12 | End: 2021-08-12

## 2021-01-12 RX ADMIN — IBUPROFEN 600 MG: 200 TABLET, FILM COATED ORAL at 21:06

## 2021-01-12 RX ADMIN — ACETAMINOPHEN 1000 MG: 500 TABLET ORAL at 21:06

## 2021-01-12 RX ADMIN — METOPROLOL SUCCINATE 25 MG: 25 TABLET, FILM COATED, EXTENDED RELEASE ORAL at 22:39

## 2021-01-12 ASSESSMENT — PAIN DESCRIPTION - FREQUENCY: FREQUENCY: CONTINUOUS

## 2021-01-12 ASSESSMENT — PAIN DESCRIPTION - ORIENTATION: ORIENTATION: RIGHT

## 2021-01-12 ASSESSMENT — PAIN DESCRIPTION - PROGRESSION: CLINICAL_PROGRESSION: GRADUALLY WORSENING

## 2021-01-12 ASSESSMENT — PAIN DESCRIPTION - DESCRIPTORS: DESCRIPTORS: ACHING

## 2021-01-12 ASSESSMENT — PAIN DESCRIPTION - PAIN TYPE: TYPE: ACUTE PAIN

## 2021-01-13 NOTE — ED NOTES
Spoke with patient about HTN and tachycardia. Patient reports she has been seeing her PCP for the elevated HR and that her PCP said it was \"okay, not to worry. \" Patient also advised to monitor her BP. Bushra MANN made aware.       Holly Rasmussen RN  01/12/21 3081

## 2021-01-13 NOTE — ED NOTES
Discharge instructions reviewed with patient, patient verbalized understanding  Follow up care discussed, patient ambulatory to exit without difficulty     Juan David Stoll RN  01/12/21 5849

## 2021-01-13 NOTE — ED PROVIDER NOTES
[facility]        Pt Name: Talon Carlos  MRN: [id number]  [place] 1970  Date of evaluation: 1/12/2021  Provider: Garth Olivares PA-C  PCP: Suri Longoria MD    JACLYN. I have evaluated this patient. My supervising physician was available for consultation. Cherelle Royal DO      CHIEF COMPLAINT       Chief Complaint   Patient presents with    Fall     fell up the stairs, tripped fell on her right arm, whole arm shoulder to hand hurts states can move hurts to move though       HISTORY OF PRESENT ILLNESS   (Location, Timing/Onset, Context/Setting, Quality, Duration, Modifying Factors, Severity, Associated Signs and Symptoms)  Note limiting factors. Talon Carlos is a 48 y.o. female patient resenting with complaint of trip and fall. 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 Primary pain right shoulder and less involving the right hand first carpal.  No headache or neck pain. No LOC. This a mechanical fall. She has some skin injury on both knees. No bleeding. Tetanus shot not indicated. Nursing Notes were all reviewed and agreed with or any disagreements were addressed in the HPI. REVIEW OF SYSTEMS    (2-9 systems for level 4, 10 or more for level 5)     Review of Systems    Positives and Pertinent negatives as per HPI. Except as noted above in the ROS, all other systems were reviewed and negative.        PAST MEDICAL HISTORY     Past Medical History:   Diagnosis Date    Asthma          SURGICAL HISTORY     Past Surgical History:   Procedure Laterality Date    ABDOMEN SURGERY           CURRENTMEDICATIONS       Discharge Medication List as of 1/12/2021 11:42 PM      CONTINUE these medications which have NOT CHANGED    Details   famotidine (PEPCID) 20 MG tablet Take 1 tablet by mouth 2 times daily, Disp-60 tablet, R-0Print      NONFORMULARY bp medication-does not know nameHistorical Med      predniSONE (DELTASONE) 20 MG tablet Take 3 tablets daily for 6 days, then 2 tablets daily for 3 days, then 1 tablets daily for 3 days, Disp-27 tablet, R-0Print      albuterol sulfate HFA (PROVENTIL HFA) 108 (90 Base) MCG/ACT inhaler Inhale 2 puffs into the lungs every 4 hours as needed for Wheezing or Shortness of Breath With spacer (and mask if indicated). Thanks. , Disp-1 Inhaler, R-1Print               ALLERGIES     Patient has no known allergies. FAMILYHISTORY     No family history on file. SOCIAL HISTORY       Social History     Tobacco Use    Smoking status: Never Smoker    Smokeless tobacco: Never Used   Substance Use Topics    Alcohol use: No    Drug use: Yes     Types: Marijuana       SCREENINGS             PHYSICAL EXAM    (up to 7 for level 4, 8 or more for level 5)     ED Triage Vitals [01/12/21 2000]   BP Temp Temp Source Pulse Resp SpO2 Height Weight   (!) 200/108 98.1 °F (36.7 °C) Tympanic 126 18 98 % 5' 4\" (1.626 m) --       Physical Exam  Vitals signs and nursing note reviewed. Constitutional:       Appearance: Normal appearance. She is well-developed. She is obese. HENT:      Head: Normocephalic and atraumatic. Right Ear: External ear normal.      Left Ear: External ear normal.   Eyes:      General: No scleral icterus. Right eye: No discharge. Left eye: No discharge. Conjunctiva/sclera: Conjunctivae normal.   Neck:      Musculoskeletal: Normal range of motion and neck supple. Cardiovascular:      Rate and Rhythm: Normal rate and regular rhythm. Heart sounds: Normal heart sounds. Pulmonary:      Effort: Pulmonary effort is normal.      Breath sounds: Normal breath sounds. Musculoskeletal:         General: Swelling and tenderness present. Comments: Pain and minimal swelling but limited range of motion right shoulder secondary to pain.   Clinically not dislocated. Will obtain x-rays. Patient without cervical spine pain. Patient with pain right hand first metacarpal and the [facility] joint. Will obtain x-ray. Skin:     General: Skin is warm and dry. Neurological:      General: No focal deficit present. Mental Status: She is alert and oriented to person, place, and time. Mental status is at baseline. Psychiatric:         Mood and Affect: Mood normal.         Behavior: Behavior normal.         Thought Content: Thought content normal.         Judgment: Judgment normal.         DIAGNOSTIC RESULTS   LABS:    Labs Reviewed - No data to display    All other labs were within normal range or not returned as of this dictation. EKG: All EKG's are interpreted by the Emergency Department Physician in the absence of a cardiologist.  Please see their note for interpretation of EKG. RADIOLOGY:   Non-plain film images such as CT, Ultrasound and MRI are read by the radiologist. Plain radiographic images are visualized and preliminarily interpreted by the ED Provider with the below findings:        Interpretation per the Radiologist below, if available at the time of this note:    XR SHOULDER RIGHT (MIN 2 VIEWS)   Final Result   Mild elevation of the clavicle, suggestive of type 3 sprain. No acute osseous injury is identified. XR HAND RIGHT (MIN 3 VIEWS)   Final Result   No acute osseous injury of the right hand. Calcific densities along the superficial palm, foreign bodies versus   dystrophic calcifications. No results found.         PROCEDURES   Unless otherwise noted below, none     Procedures    CRITICAL CARE TIME   N/A    CONSULTS:  None      EMERGENCY DEPARTMENT COURSE and DIFFERENTIAL DIAGNOSIS/MDM:   Vitals:    Vitals:    01/12/21 2000 01/12/21 2213 01/12/21 2333   BP: (!) 200/108 (!) 172/98 (!) 176/101   Pulse: 126 115 110   Resp: 18 17 16   Temp: 98.1 °F (36.7 °C)     TempSrc: Tympanic     SpO2: 98% 100% 100%   Height: 5' 4\" (1.626 m)         Patient was given the following medications:  Medications   ibuprofen (ADVIL;MOTRIN) tablet 600 mg (600 mg Oral Given 1/12/21 2106)   acetaminophen (TYLENOL) tablet 1,000 mg (1,000 mg Oral Given 1/12/21 2106)   metoprolol succinate (TOPROL XL) extended release tablet 25 mg (25 mg Oral Given 1/12/21 2239)           Patient with history of fall resulting in a mild AC separation is noted on x-ray. Sling applied. Patient hypertensive and tachycardic. The patient's initial blood pressure 200/108. Pulse 126. Pain plays a factor. She does have documented hypertension and tachycardia as noted at previous ED visits and PCP. She is currently not on treatment for these disorders. I reviewed most recent EKG showing no acute pathology. Patient was given Toprol-XL 25 mg in the department. Her blood pressure settled to 176/101 and pulse down to 110. I do believe this to be chronic in nature and does need more treatment under direction of healthcare provider. While the patient remains slightly tachycardic at 110 and BP improved to 176/101 this is an improvement over her baseline. I did review the chart and patient has similar values. She is negative from a cardiovascular symptomatic standpoint. She will follow up with her healthcare provider. Going home I did prescribe Toprol-XL 50 mg as she was given a Toprol-XL 25 mg this evening. No adverse effects reported by the patient. The patient does express understanding of her diagnosis and the treatment plan. FINAL IMPRESSION      1. AC separation, right, initial encounter    2. Fall on same level from slipping, tripping or stumbling, initial encounter    3. Uncontrolled hypertension    4.  Tachycardia          DISPOSITION/PLAN   DISPOSITION Decision To Discharge 01/12/2021 10:05:04 PM      PATIENT REFERREDTO:  Doug Forte MD  [address]   [address] [place] [address]  [phone]    Schedule an appointment as soon as possible for a visit in 3 days      Aurelio Wallace  [address]  [place] [address]  [phone]    Schedule an appointment as soon as possible for a visit   As needed    [facility]  [address] [id number]  [phone]  Go to   If symptoms worsen      DISCHARGE MEDICATIONS:  Discharge Medication List as of 1/12/2021 11:42 PM      START taking these medications    Details   HYDROcodone-acetaminophen (NORCO) 5-325 MG per tablet Take 1 tablet by mouth every 6 hours as needed for Pain for up to 4 days. , Disp-10 tablet, R-0Print      ibuprofen (ADVIL;MOTRIN) 600 MG tablet Take 1 tablet by mouth 3 times daily (with meals), Disp-30 tablet, R-0Print      acetaminophen (TYLENOL) 500 MG tablet Take 2 tablets by mouth 3 times daily (with meals), Disp-30 tablet, R-0Print      metoprolol succinate (TOPROL XL) 50 MG extended release tablet Take 1 tablet by mouth daily, Disp-30 tablet, R-0Print             DISCONTINUED MEDICATIONS:  Discharge Medication List as of 1/12/2021 11:42 PM            Periodic Controlled Substance Monitoring: No signs of potential drug abuse or diversion identified. Melany Schroeder PA-C)    (Please note that portions of this note were completed with a voice recognition program.  Efforts were made to edit the dictations but occasionally words are mis-transcribed. )    Melany Schroeder PA-C (electronically signed)           Melany Schroeder PA-C  01/13/21 2033

## 2021-01-20 ENCOUNTER — OFFICE VISIT (OUTPATIENT)
Dept: ORTHOPEDIC SURGERY | Age: 51
End: 2021-01-20
Payer: MEDICARE

## 2021-01-20 VITALS — RESPIRATION RATE: 16 BRPM | HEIGHT: 63 IN | WEIGHT: 240.2 LBS | BODY MASS INDEX: 42.56 KG/M2 | TEMPERATURE: 97.7 F

## 2021-01-20 DIAGNOSIS — S46.011A TRAUMATIC ROTATOR CUFF TEAR, RIGHT, INITIAL ENCOUNTER: Primary | ICD-10-CM

## 2021-01-20 PROCEDURE — G8484 FLU IMMUNIZE NO ADMIN: HCPCS | Performed by: ORTHOPAEDIC SURGERY

## 2021-01-20 PROCEDURE — G8417 CALC BMI ABV UP PARAM F/U: HCPCS | Performed by: ORTHOPAEDIC SURGERY

## 2021-01-20 PROCEDURE — 3017F COLORECTAL CA SCREEN DOC REV: CPT | Performed by: ORTHOPAEDIC SURGERY

## 2021-01-20 PROCEDURE — 99203 OFFICE O/P NEW LOW 30 MIN: CPT | Performed by: ORTHOPAEDIC SURGERY

## 2021-01-20 PROCEDURE — 1036F TOBACCO NON-USER: CPT | Performed by: ORTHOPAEDIC SURGERY

## 2021-01-20 PROCEDURE — G8428 CUR MEDS NOT DOCUMENT: HCPCS | Performed by: ORTHOPAEDIC SURGERY

## 2021-01-20 RX ORDER — METHYLPREDNISOLONE 4 MG/1
TABLET ORAL
Qty: 1 KIT | Refills: 0 | Status: SHIPPED | OUTPATIENT
Start: 2021-01-20 | End: 2021-01-26

## 2021-01-20 NOTE — PROGRESS NOTES
ORTHOPAEDIC NEW PATIENT NOTE    Chief Complaint   Patient presents with    Shoulder Injury     right shoulder        HPI   1/20/2021  48 y.o. female RHD seen for evaluation of right shoulder injury:    Onset 1/12/2021 (8 days ago)  Injury/trauma - fell up the stairs, landed [address], and jammed right shoulder upwards, hit lateral shoulder   She reports feeling a pop when this occurred as well  Pain is rated 5/10   History of symptoms - previous fall years ago  Pain is located right lateral shoulder/arm  Worse with pressure, ROM, lifting, laying on right side  Better with rest, arm at side  Associated with stiffness    Neck pain = no  Radicular symptoms = intermittent  Numbness and/or tingling = intermittent      Review of Systems  I have read over the ROS from the Patient History Form dated on 1/20/2021  Pertinent positives include weight change, sinus trouble, HTN, back pain  Rest of 13 point ROS otherwise negative except per HPI, and scanned into the patient's chart under the Media tab.     No Known Allergies     Current Outpatient Medications   Medication Sig Dispense Refill    methylPREDNISolone (MEDROL, ANATSASIIA,) 4 MG tablet Take by mouth as directed on kit 1 kit 0    ibuprofen (ADVIL;MOTRIN) 600 MG tablet Take 1 tablet by mouth 3 times daily (with meals) 30 tablet 0    acetaminophen (TYLENOL) 500 MG tablet Take 2 tablets by mouth 3 times daily (with meals) 30 tablet 0    metoprolol succinate (TOPROL XL) 50 MG extended release tablet Take 1 tablet by mouth daily 30 tablet 0    famotidine (PEPCID) 20 MG tablet Take 1 tablet by mouth 2 times daily 60 tablet 0    NONFORMULARY bp medication-does not know name      predniSONE (DELTASONE) 20 MG tablet Take 3 tablets daily for 6 days, then 2 tablets daily for 3 days, then 1 tablets daily for 3 days 27 tablet 0  albuterol sulfate HFA (PROVENTIL HFA) 108 (90 Base) MCG/ACT inhaler Inhale 2 puffs into the lungs every 4 hours as needed for Wheezing or Shortness of Breath With spacer (and mask if indicated). Thanks. 1 Inhaler 1     No current facility-administered medications for this visit. Past Medical History:   Diagnosis Date    Asthma         Past Surgical History:   Procedure Laterality Date    ABDOMEN SURGERY         No family history on file.       Social History     Socioeconomic History    Marital status:      Spouse name: Not on file    Number of children: Not on file    Years of education: Not on file    Highest education level: Not on file   Occupational History    Not on file   Social Needs    Financial resource strain: Not on file    Food insecurity     Worry: Not on file     Inability: Not on file    Transportation needs     Medical: Not on file     Non-medical: Not on file   Tobacco Use    Smoking status: Never Smoker    Smokeless tobacco: Never Used   Substance and Sexual Activity    Alcohol use: No    Drug use: Yes     Types: Marijuana    Sexual activity: Not on file   Lifestyle    Physical activity     Days per week: Not on file     Minutes per session: Not on file    Stress: Not on file   Relationships    Social connections     Talks on phone: Not on file     Gets together: Not on file     Attends Mandaeism service: Not on file     Active member of club or organization: Not on file     Attends meetings of clubs or organizations: Not on file     Relationship status: Not on file    Intimate partner violence     Fear of current or ex partner: Not on file     Emotionally abused: Not on file     Physically abused: Not on file     Forced sexual activity: Not on file   Other Topics Concern    Not on file   Social History Narrative    Not on file        Vitals:    01/20/21 0900   Resp: 16   Temp: 97.7 °F (36.5 °C)   TempSrc: Infrared   Weight: 240 lb 3.2 oz (109 kg) Height: 5' 3\" (1.6 m)       Physical Exam  Constitutional  well-groomed, well-nourished, Body mass index is 42.55 kg/m². Psychiatric  pleasant, normal mood & affect, oriented to place, person, and situation  Cardiovascular  RRR, equivocal peripheral edema, radial pulse 2+  Respiratory  respirations unlabored, on room air; face mask on  Skin  no rashes, wounds, or lesions seen on exposed skin  Neck/Spine - full ROM, no TTP of spinous processes, no TTP of paraspinal musculature,  negative Spurling's  Neurological - SILT M/U/R/A nerve distributions; AIN/PIN/IO intact  Right shoulder:   No obvious deformity/swelling/ecchymosis   No atrophy seen, but limited by body habitus   moderate TTP over lateral shoulder and bicipital groove   Range of Motion: Forward flexion:  Active 90, AA to 100    Abduction:  Active 40    External rotation with arm at side:  50    Internal rotation:  L4   Strength:    Abduction:  4-/5     External rotation:  4/5    Special tests:    equivocal lift-off sign    negative belly-press test    positive Hawkin's test    positive Speed's test        Imaging:  Images were personally reviewed by myself and discussed with the patient  Right shoulder 2 views performed today in clinic supplementing previously performed shoulder plain films  - glenohumeral articulation is mildly narrowed with no evidence of subchondral cystic changes or osteophytes, there are no loose bodies appreciated. Bruna's line is preserved. On axillary view, the humeral head is well-centered within the glenoid. The acromioclavicular joint demonstrates no significant degenerative changes. There are chronic cystic changes involving the tuberosities. Assessment & Plan:  48 y.o. female who presents with    Diagnosis Orders   1. Traumatic rotator cuff tear, right, initial encounter  XR SHOULDER RIGHT (MIN 2 VIEWS)    MRI SHOULDER RIGHT WO CONTRAST       No orders of the defined types were placed in this encounter. Concerned for traumatic cuff tear with her fall last week  Limited ROM and weak on exam  No preceding symptoms prior to this fall    Therefore, I will order an MRI to evaluate. The patient is advised to return once completed so that we may review the images together and to discuss treatment options.     In the meantime, ROM HEP given to prevent further stiffness, regain full ROM (particularly FE and abduction)  Ice/heat, OTC meds  Medrol dosepak rx sent to pharmacy as well    I will see her back with MRI images to discuss tx options    Tohsia Du

## 2021-01-21 ENCOUNTER — TELEPHONE (OUTPATIENT)
Dept: ORTHOPEDIC SURGERY | Age: 51
End: 2021-01-21

## 2021-01-21 NOTE — TELEPHONE ENCOUNTER
I spoke with Jeannette Jane and told her that her MRI is approved. I gave her the number to call. I also told her to please call back and make an appt to see Dr Celina Davis to go over the results.  She understood

## 2021-01-22 ENCOUNTER — HOSPITAL ENCOUNTER (OUTPATIENT)
Dept: MRI IMAGING | Age: 51
Discharge: HOME | End: 2021-01-22
Payer: MEDICARE

## 2021-01-22 DIAGNOSIS — S46.011A TRAUMATIC ROTATOR CUFF TEAR, RIGHT, INITIAL ENCOUNTER: ICD-10-CM

## 2021-08-07 ENCOUNTER — HOSPITAL ENCOUNTER (OUTPATIENT)
Age: 51
Discharge: HOME | End: 2021-08-07
Payer: MEDICARE

## 2021-08-07 ENCOUNTER — APPOINTMENT (OUTPATIENT)
Dept: GENERAL RADIOLOGY | Age: 51
End: 2021-08-07
Payer: MEDICARE

## 2021-08-07 ENCOUNTER — HOSPITAL ENCOUNTER (EMERGENCY)
Age: 51
End: 2021-08-07
Payer: MEDICARE

## 2021-08-07 PROCEDURE — 72040 X-RAY EXAM NECK SPINE 2-3 VW: CPT

## 2021-08-12 ENCOUNTER — HOSPITAL ENCOUNTER (EMERGENCY)
Age: 51
Discharge: HOME | End: 2021-08-13
Attending: EMERGENCY MEDICINE
Payer: MEDICARE

## 2021-08-12 ENCOUNTER — APPOINTMENT (OUTPATIENT)
Dept: CT IMAGING | Age: 51
End: 2021-08-12
Payer: MEDICARE

## 2021-08-12 ENCOUNTER — HOSPITAL ENCOUNTER (EMERGENCY)
Age: 51
Discharge: HOME | End: 2021-08-12
Payer: MEDICARE

## 2021-08-12 VITALS
BODY MASS INDEX: 43.22 KG/M2 | HEART RATE: 97 BPM | TEMPERATURE: 99.3 F | WEIGHT: 244 LBS | SYSTOLIC BLOOD PRESSURE: 162 MMHG | DIASTOLIC BLOOD PRESSURE: 101 MMHG | RESPIRATION RATE: 18 BRPM | OXYGEN SATURATION: 97 %

## 2021-08-12 DIAGNOSIS — R03.0 ELEVATED BLOOD PRESSURE READING: ICD-10-CM

## 2021-08-12 DIAGNOSIS — M47.812 CERVICAL SPONDYLOSIS: Primary | ICD-10-CM

## 2021-08-12 DIAGNOSIS — M54.9 UPPER BACK PAIN ON LEFT SIDE: Primary | ICD-10-CM

## 2021-08-12 DIAGNOSIS — T50.905A ADVERSE DRUG EFFECT, INITIAL ENCOUNTER: ICD-10-CM

## 2021-08-12 DIAGNOSIS — M54.12 LEFT CERVICAL RADICULOPATHY: ICD-10-CM

## 2021-08-12 LAB
A/G RATIO: 0.9 (ref 1.1–2.2)
ALBUMIN SERPL-MCNC: 4.2 G/DL (ref 3.4–5)
ALP BLD-CCNC: 80 U/L (ref 40–129)
ALT SERPL-CCNC: 11 U/L (ref 10–40)
ANION GAP SERPL CALCULATED.3IONS-SCNC: 14 MMOL/L (ref 3–16)
AST SERPL-CCNC: 13 U/L (ref 15–37)
BASOPHILS ABSOLUTE: 0 K/UL (ref 0–0.2)
BASOPHILS RELATIVE PERCENT: 0.4 %
BILIRUB SERPL-MCNC: <0.2 MG/DL (ref 0–1)
BUN BLDV-MCNC: 17 MG/DL (ref 7–20)
CALCIUM SERPL-MCNC: 9.9 MG/DL (ref 8.3–10.6)
CHLORIDE BLD-SCNC: 103 MMOL/L (ref 99–110)
CO2: 19 MMOL/L (ref 21–32)
CREAT SERPL-MCNC: 0.9 MG/DL (ref 0.6–1.1)
EOSINOPHILS ABSOLUTE: 0 K/UL (ref 0–0.6)
EOSINOPHILS RELATIVE PERCENT: 0 %
GFR AFRICAN AMERICAN: >60
GFR NON-AFRICAN AMERICAN: >60
GLOBULIN: 4.7 G/DL
GLUCOSE BLD-MCNC: 147 MG/DL (ref 70–99)
HCG QUALITATIVE: NEGATIVE
HCT VFR BLD CALC: 39.2 % (ref 36–48)
HEMOGLOBIN: 12.6 G/DL (ref 12–16)
LYMPHOCYTES ABSOLUTE: 1.3 K/UL (ref 1–5.1)
LYMPHOCYTES RELATIVE PERCENT: 16.3 %
MCH RBC QN AUTO: 24.2 PG (ref 26–34)
MCHC RBC AUTO-ENTMCNC: 32.2 G/DL (ref 31–36)
MCV RBC AUTO: 75.1 FL (ref 80–100)
MONOCYTES ABSOLUTE: 0.1 K/UL (ref 0–1.3)
MONOCYTES RELATIVE PERCENT: 1.1 %
NEUTROPHILS ABSOLUTE: 6.3 K/UL (ref 1.7–7.7)
NEUTROPHILS RELATIVE PERCENT: 82.2 %
PDW BLD-RTO: 23.7 % (ref 12.4–15.4)
PLATELET # BLD: 457 K/UL (ref 135–450)
PMV BLD AUTO: 7.9 FL (ref 5–10.5)
POTASSIUM REFLEX MAGNESIUM: 4.7 MMOL/L (ref 3.5–5.1)
PRO-BNP: 31 PG/ML (ref 0–124)
RBC # BLD: 5.22 M/UL (ref 4–5.2)
SODIUM BLD-SCNC: 136 MMOL/L (ref 136–145)
TOTAL PROTEIN: 8.9 G/DL (ref 6.4–8.2)
TROPONIN: <0.01 NG/ML
WBC # BLD: 7.7 K/UL (ref 4–11)

## 2021-08-12 PROCEDURE — 99283 EMERGENCY DEPT VISIT LOW MDM: CPT

## 2021-08-12 PROCEDURE — 85025 COMPLETE CBC W/AUTO DIFF WBC: CPT

## 2021-08-12 PROCEDURE — 6360000004 HC RX CONTRAST MEDICATION: Performed by: EMERGENCY MEDICINE

## 2021-08-12 PROCEDURE — 84484 ASSAY OF TROPONIN QUANT: CPT

## 2021-08-12 PROCEDURE — 36415 COLL VENOUS BLD VENIPUNCTURE: CPT

## 2021-08-12 PROCEDURE — 84703 CHORIONIC GONADOTROPIN ASSAY: CPT

## 2021-08-12 PROCEDURE — 83880 ASSAY OF NATRIURETIC PEPTIDE: CPT

## 2021-08-12 PROCEDURE — 6370000000 HC RX 637 (ALT 250 FOR IP): Performed by: PHYSICIAN ASSISTANT

## 2021-08-12 PROCEDURE — 71260 CT THORAX DX C+: CPT

## 2021-08-12 PROCEDURE — 80053 COMPREHEN METABOLIC PANEL: CPT

## 2021-08-12 RX ORDER — MORPHINE SULFATE 2 MG/ML
2 INJECTION, SOLUTION INTRAMUSCULAR; INTRAVENOUS ONCE
Status: COMPLETED | OUTPATIENT
Start: 2021-08-12 | End: 2021-08-13

## 2021-08-12 RX ORDER — PREDNISONE 20 MG/1
60 TABLET ORAL ONCE
Status: COMPLETED | OUTPATIENT
Start: 2021-08-12 | End: 2021-08-12

## 2021-08-12 RX ORDER — PREDNISONE 10 MG/1
TABLET ORAL
Qty: 30 TABLET | Refills: 0 | Status: SHIPPED | OUTPATIENT
Start: 2021-08-12 | End: 2021-08-12 | Stop reason: SDUPTHER

## 2021-08-12 RX ORDER — ONDANSETRON 2 MG/ML
4 INJECTION INTRAMUSCULAR; INTRAVENOUS ONCE
Status: COMPLETED | OUTPATIENT
Start: 2021-08-12 | End: 2021-08-13

## 2021-08-12 RX ORDER — HYDROCODONE BITARTRATE AND ACETAMINOPHEN 5; 325 MG/1; MG/1
1 TABLET ORAL EVERY 6 HOURS PRN
Qty: 8 TABLET | Refills: 0 | Status: SHIPPED | OUTPATIENT
Start: 2021-08-12 | End: 2021-08-12 | Stop reason: SDUPTHER

## 2021-08-12 RX ORDER — HYDROCODONE BITARTRATE AND ACETAMINOPHEN 5; 325 MG/1; MG/1
1 TABLET ORAL EVERY 6 HOURS PRN
Qty: 8 TABLET | Refills: 0 | Status: SHIPPED | OUTPATIENT
Start: 2021-08-12 | End: 2021-08-14

## 2021-08-12 RX ORDER — 0.9 % SODIUM CHLORIDE 0.9 %
1000 INTRAVENOUS SOLUTION INTRAVENOUS ONCE
Status: COMPLETED | OUTPATIENT
Start: 2021-08-12 | End: 2021-08-13

## 2021-08-12 RX ORDER — PREDNISONE 10 MG/1
TABLET ORAL
Qty: 30 TABLET | Refills: 0 | Status: SHIPPED | OUTPATIENT
Start: 2021-08-12 | End: 2021-08-22

## 2021-08-12 RX ORDER — LIDOCAINE 4 G/G
1 PATCH TOPICAL ONCE
Status: DISCONTINUED | OUTPATIENT
Start: 2021-08-12 | End: 2021-08-12 | Stop reason: HOSPADM

## 2021-08-12 RX ADMIN — IOPAMIDOL 75 ML: 755 INJECTION, SOLUTION INTRAVENOUS at 22:51

## 2021-08-12 RX ADMIN — PREDNISONE 60 MG: 20 TABLET ORAL at 11:15

## 2021-08-12 ASSESSMENT — ENCOUNTER SYMPTOMS
SHORTNESS OF BREATH: 0
ABDOMINAL PAIN: 0
NAUSEA: 0
VOMITING: 0
CHEST TIGHTNESS: 0
DIARRHEA: 0

## 2021-08-12 ASSESSMENT — PAIN DESCRIPTION - ORIENTATION: ORIENTATION: LEFT

## 2021-08-12 ASSESSMENT — PAIN SCALES - GENERAL
PAINLEVEL_OUTOF10: 10
PAINLEVEL_OUTOF10: 8

## 2021-08-12 ASSESSMENT — PAIN DESCRIPTION - PAIN TYPE: TYPE: ACUTE PAIN

## 2021-08-12 ASSESSMENT — PAIN DESCRIPTION - LOCATION: LOCATION: ARM;SHOULDER

## 2021-08-12 NOTE — ED NOTES
Pt Discharged in stable condition, VSS, no signs of distress, discharge instructions and meds reviewed. Pt verbalizes understanding and states no further questions or concerns unaddressed.        Ivonne Jerez RN  08/12/21 1006

## 2021-08-12 NOTE — ED PROVIDER NOTES
[address] [facility]        Pt Name: Daylin Jama  MRN: [id number]  [place] 1970  Date of evaluation: 8/12/2021  Provider: Poonam Cruz PA-C  PCP: Moses Smith MD  Note Started: 10:51 AM EDT       JACLYN. I have evaluated this patient. My supervising physician was available for consultation. CHIEF COMPLAINT       Chief Complaint   Patient presents with    Back Pain     Pt reports pain that radiates from left side of back to left arm and left fingers, recent Xrays    Neck Injury     Pt reports back/arm pain is causing her not to be able to move her neck, starts 7/23/21       HISTORY OF PRESENT ILLNESS   (Location, Timing/Onset, Context/Setting, Quality, Duration, Modifying Factors, Severity, Associated Signs and Symptoms)  Note limiting factors. Chief Complaint: Neck and left arm pain    Daylin Jama is a 46 y.o. female who presents to the emergency department with increasing symptoms related to neck pain and left arm pain. Patient's been under the active care of the nurse practitioner had radiographs completed of her neck where she has multilevel spondylolysis. She states she also has known arthritis in the lower portions of her back as well as her knee. She is taking meloxicam for this on a regular basis. Recent addition of methocarbamol by the provider because of the above-mentioned. Unfortunate the patient symptoms are worsening. She states that now she is having pain and discomfort where she has pain in her neck radiating down her left arm into her thumb and index finger. She states that she is unable to get a restful night sleep and is having horrible pain because of this. She states she has not been on steroids nor does she have any medication in the home environment to assist with the symptoms. Patient states she has no red flags for cervicalgia. She denies bowel or bladder dysfunction denies saddle anesthesia.  She is a normal gait and was able to drive herself to the emergency department today. Current level of pain and discomfort rates to be 10 out of 10 and with that she presents for evaluation and treatment. Nursing Notes were all reviewed and agreed with or any disagreements were addressed in the HPI. REVIEW OF SYSTEMS    (2-9 systems for level 4, 10 or more for level 5)     Review of Systems   Constitutional: Negative for activity change, chills and fever. Respiratory: Negative for chest tightness and shortness of breath. Cardiovascular: Negative for chest pain. Gastrointestinal: Negative for abdominal pain, diarrhea, nausea and vomiting. Genitourinary: Negative for dysuria and flank pain. Musculoskeletal: Positive for arthralgias, neck pain and neck stiffness. Neurological: Positive for numbness. Negative for syncope. All other systems reviewed and are negative. Positives and Pertinent negatives as per HPI. Except as noted above in the ROS, all other systems were reviewed and negative. PAST MEDICAL HISTORY     Past Medical History:   Diagnosis Date    Asthma          SURGICAL HISTORY     Past Surgical History:   Procedure Laterality Date    ABDOMEN SURGERY           CURRENTMEDICATIONS       Current Discharge Medication List      CONTINUE these medications which have NOT CHANGED    Details   acetaminophen (TYLENOL) 500 MG tablet Take 2 tablets by mouth 3 times daily (with meals)  Qty: 30 tablet, Refills: 0      metoprolol succinate (TOPROL XL) 50 MG extended release tablet Take 1 tablet by mouth daily  Qty: 30 tablet, Refills: 0      famotidine (PEPCID) 20 MG tablet Take 1 tablet by mouth 2 times daily  Qty: 60 tablet, Refills: 0      NONFORMULARY bp medication-does not know name      albuterol sulfate HFA (PROVENTIL HFA) 108 (90 Base) MCG/ACT inhaler Inhale 2 puffs into the lungs every 4 hours as needed for Wheezing or Shortness of Breath With spacer (and mask if indicated). Thanks.   Qty: 1 Inhaler, Refills: 1               ALLERGIES     Patient has no known allergies. FAMILYHISTORY     History reviewed. No pertinent family history. SOCIAL HISTORY       Social History     Tobacco Use    Smoking status: Never Smoker    Smokeless tobacco: Never Used   Substance Use Topics    Alcohol use: No    Drug use: Yes     Types: Marijuana       SCREENINGS             PHYSICAL EXAM    (up to 7 for level 4, 8 or more for level 5)     ED Triage Vitals [08/12/21 1040]   BP Temp Temp src Pulse Resp SpO2 Height Weight   (!) 162/101 99.3 °F (37.4 °C) -- 97 18 97 % -- 244 lb (110.7 kg)       Physical Exam  Vitals and nursing note reviewed. Constitutional:       General: She is awake. She is not in acute distress. Appearance: Normal appearance. She is well-developed. She is not ill-appearing or diaphoretic. HENT:      Head: Normocephalic and atraumatic. No raccoon eyes, Jim's sign or laceration. Right Ear: External ear normal.      Left Ear: External ear normal.   Eyes:      General: No scleral icterus. Right eye: No discharge. Left eye: No discharge. Conjunctiva/sclera: Conjunctivae normal.   Neck:      Vascular: No JVD. Comments: Mild left-sided paracervical tenderness. Negative Spurling's examination. 5 out of 5 strength to shoulder abduction, elbow flexion extension, wrist flexion extension as well as  strength. Neurovascular integrity intact. Cardiovascular:      Rate and Rhythm: Normal rate and regular rhythm. Heart sounds: No murmur heard. No friction rub. No gallop. Pulmonary:      Effort: Pulmonary effort is normal. No accessory muscle usage or respiratory distress. Breath sounds: Normal breath sounds. No wheezing, rhonchi or rales. Abdominal:      General: There is no distension. Palpations: Abdomen is soft. Abdomen is not rigid. There is no mass. Tenderness: There is no abdominal tenderness. There is no guarding or rebound. Musculoskeletal:      Cervical back: Normal range of motion and neck supple. No rigidity. Pain with movement and muscular tenderness present. Skin:     General: Skin is warm and dry. Neurological:      Mental Status: She is alert and oriented to person, place, and time. GCS: GCS eye subscore is 4. GCS verbal subscore is 5. GCS motor subscore is 6. Cranial Nerves: No cranial nerve deficit. Sensory: No sensory deficit. Coordination: Coordination normal.   Psychiatric:         Behavior: Behavior normal. Behavior is cooperative. DIAGNOSTIC RESULTS   LABS:    Labs Reviewed - No data to display    When ordered only abnormal lab results are displayed. All other labs were within normal range or not returned as of this dictation. EKG: When ordered, EKG's are interpreted by the Emergency Department Physician in the absence of a cardiologist.  Please see their note for interpretation of EKG. RADIOLOGY:   Non-plain film images such as CT, Ultrasound and MRI are read by the radiologist. Plain radiographic images are visualized and preliminarily interpreted by the ED Provider with the below findings:        Interpretation per the Radiologist below, if available at the time of this note:    No orders to display     XR CERVICAL SPINE (2-3 VIEWS)    Result Date: 8/7/2021  EXAMINATION: For XRAY VIEWS OF THE CERVICAL SPINE 8/7/2021 3:47 pm COMPARISON: None. HISTORY: ORDERING SYSTEM PROVIDED HISTORY: m54.2 TECHNOLOGIST PROVIDED HISTORY: Reason for exam:->m54.2 Reason for Exam: pain Acuity: Unknown Type of Exam: Unknown FINDINGS: The 7 cervical vertebral bodies are in anatomic alignment. There is straightening of the normal cervical lordosis. The vertebral body heights maintained. There is mild multilevel spondylosis and facet arthropathy. The prevertebral soft tissues and craniocervical junction are unremarkable. 1. Mild multilevel spondylosis.            PROCEDURES   Unless otherwise noted below, none     Procedures    CRITICAL CARE TIME   N/A    CONSULTS:  None      EMERGENCY DEPARTMENT COURSE and DIFFERENTIAL DIAGNOSIS/MDM:   Vitals:    Vitals:    08/12/21 1040   BP: (!) 162/101   Pulse: 97   Resp: 18   Temp: 99.3 °F (37.4 °C)   SpO2: 97%   Weight: 244 lb (110.7 kg)       Patient was given the following medications:  Medications   predniSONE (DELTASONE) tablet 60 mg (has no administration in time range)   lidocaine 4 % external patch 1 patch (has no administration in time range)           The patient's detailed history of present illness is documented as above. Upon arrival to the emergency department the patient's vital signs are as documented. The patient is noted to be hemodynamically stable and afebrile. Physical examination findings are as above. I have had the opportunity review the films that were done of the patient cervical spine and she has had no new injury I don't think that anything further needs to be done. She has multilevel cervical spondylosis and is experiencing left-sided radicular symptoms today in a C6 dermatome. Medications here as well as for home follow-up with her primary care provider strict potential instructions for return. The patient has been made aware of the signs and symptoms which would necessitate an immediate return to the emergency department and verbalizes an understanding of these signs and symptoms. I estimate there is low risk for cauda equina or central cord compression syndrome, epidural lesion or abscess, meningitis or acute/severe spinal stenosis requiring emergent treatment and or any additional pathology that would require further emergency advanced imaging or admission and therefor I consider the discharge disposition most reasonable. The patient and/or family and I have discussed the diagnosis and risks, and we agree with discharging home to follow-up with their primary doctor.  We also discussed returning to the emergency department immediately if new or worsening symptoms occur. We have discussed the symptoms which are most concerning (e.g., numbness or weakness of the arms or legs, saddle anesthesia, urinary or bowel incontinence or retention, changing or worsening pain) that would necessitate an immediate return. FINAL IMPRESSION      1. Cervical spondylosis    2. Left cervical radiculopathy    3. Elevated blood pressure reading          DISPOSITION/PLAN   DISPOSITION Decision To Discharge 08/12/2021 10:55:02 AM      PATIENT REFERRED TO:  Hunter Vergara MD  [address] [place], [place] [id number] [id number]    Schedule an appointment as soon as possible for a visit       [facility] Emergency Department  [address] [facility]  [phone]    If symptoms worsen      DISCHARGE MEDICATIONS:  Current Discharge Medication List      START taking these medications    Details   HYDROcodone-acetaminophen (NORCO) 5-325 MG per tablet Take 1 tablet by mouth every 6 hours as needed for Pain for up to 2 days. Qty: 8 tablet, Refills: 0    Associated Diagnoses: Cervical spondylosis;  Left cervical radiculopathy             DISCONTINUED MEDICATIONS:  Current Discharge Medication List      STOP taking these medications       ibuprofen (ADVIL;MOTRIN) 600 MG tablet Comments:   Reason for Stopping:                      (Please note that portions of this note were completed with a voice recognition program.  Efforts were made to edit the dictations but occasionally words are mis-transcribed.)    Camila Cramer PA-C (electronically signed)           Yoly Hagan PA-C  08/12/21 [id number]

## 2021-08-13 VITALS
HEART RATE: 103 BPM | WEIGHT: 240 LBS | DIASTOLIC BLOOD PRESSURE: 93 MMHG | OXYGEN SATURATION: 97 % | SYSTOLIC BLOOD PRESSURE: 161 MMHG | HEIGHT: 63 IN | RESPIRATION RATE: 18 BRPM | BODY MASS INDEX: 42.52 KG/M2 | TEMPERATURE: 98.5 F

## 2021-08-13 LAB
EKG ATRIAL RATE: 102 BPM
EKG DIAGNOSIS: NORMAL
EKG P AXIS: 60 DEGREES
EKG P-R INTERVAL: 126 MS
EKG Q-T INTERVAL: 348 MS
EKG QRS DURATION: 70 MS
EKG QTC CALCULATION (BAZETT): 453 MS
EKG R AXIS: -20 DEGREES
EKG T AXIS: 16 DEGREES
EKG VENTRICULAR RATE: 102 BPM

## 2021-08-13 PROCEDURE — 6360000002 HC RX W HCPCS: Performed by: PHYSICIAN ASSISTANT

## 2021-08-13 PROCEDURE — 93005 ELECTROCARDIOGRAM TRACING: CPT | Performed by: PHYSICIAN ASSISTANT

## 2021-08-13 PROCEDURE — 93010 ELECTROCARDIOGRAM REPORT: CPT | Performed by: INTERNAL MEDICINE

## 2021-08-13 PROCEDURE — 2580000003 HC RX 258: Performed by: PHYSICIAN ASSISTANT

## 2021-08-13 RX ADMIN — MORPHINE SULFATE 2 MG: 2 INJECTION, SOLUTION INTRAMUSCULAR; INTRAVENOUS at 00:41

## 2021-08-13 RX ADMIN — SODIUM CHLORIDE 1000 ML: 9 INJECTION, SOLUTION INTRAVENOUS at 00:05

## 2021-08-13 RX ADMIN — ONDANSETRON 4 MG: 2 INJECTION INTRAMUSCULAR; INTRAVENOUS at 00:41

## 2021-08-13 ASSESSMENT — ENCOUNTER SYMPTOMS
COUGH: 0
CHEST TIGHTNESS: 0
SHORTNESS OF BREATH: 0
COLOR CHANGE: 0
NAUSEA: 0
BACK PAIN: 0
RESPIRATORY NEGATIVE: 1
ABDOMINAL PAIN: 0
CONSTIPATION: 0
PHOTOPHOBIA: 0
VOMITING: 0
DIARRHEA: 0

## 2021-08-13 ASSESSMENT — PAIN SCALES - GENERAL: PAINLEVEL_OUTOF10: 8

## 2021-08-13 NOTE — ED PROVIDER NOTES
As physician-in-triage, I performed a medical screening history and physical exam on Rachael Pineda. I also cared for and evaluated the patient in conjunction with the ED Advanced Practice Provider. All diagnostic, treatment, and disposition decisions were made by myself in conjunction with the advanced practice provider. For all further details of the patient's emergency department visit, please see the advanced practice provider's documentation. Patient presents the ER for evaluation of sensation of palpitations recent prolonged travel, complaining of persistent arthralgia the shoulder, and he had sensation of adverse reaction to underlying hydrocodone, she is normal respirations no tachypnea no hyper apnea, mild tachycardia.   No evidence of PE or dissection she is to continue her home analgesia she was provided antiemetic therapy and analgesia in ER she is return if she is worse or new symptoms      Impression: Palpitations, medication adverse reaction, arthralgia shoulder      Raghavendra Pain, MD  50/68/04 0121       Raghavendra Pain, MD  15/71/84 0049

## 2021-08-13 NOTE — ED PROVIDER NOTES
pleuritic pain, orthopnea, pedal edema or calf tenderness. Denies fever chills. Denies rashes or lesions. Denies abdominal pain, nausea/vomiting, urinary symptoms or changes in bowel movements. Denies any injury or trauma. Denies any falls. Denies headache, visual changes, speech disturbances or numbness/tingling. Nursing Notes were all reviewed and agreed with or any disagreements were addressed in the HPI. REVIEW OF SYSTEMS    (2-9 systems for level 4, 10 or more for level 5)     Review of Systems   Constitutional: Negative for activity change, appetite change, chills, diaphoresis, fatigue and fever. Eyes: Negative for photophobia and visual disturbance. Respiratory: Negative. Negative for cough, chest tightness and shortness of breath. Cardiovascular: Positive for palpitations. Negative for chest pain and leg swelling. Gastrointestinal: Negative for abdominal pain, constipation, diarrhea, nausea and vomiting. Genitourinary: Negative for decreased urine volume, difficulty urinating, dysuria, flank pain, frequency, hematuria and urgency. Musculoskeletal: Positive for arthralgias, myalgias and neck pain. Negative for back pain, gait problem, joint swelling and neck stiffness. Skin: Negative for color change, pallor, rash and wound. Neurological: Negative for dizziness, tremors, seizures, syncope, facial asymmetry, speech difficulty, weakness, light-headedness, numbness and headaches. Positives and Pertinent negatives as per HPI. Except as noted above in the ROS, all other systems were reviewed and negative.        PAST MEDICAL HISTORY     Past Medical History:   Diagnosis Date    Asthma          SURGICAL HISTORY     Past Surgical History:   Procedure Laterality Date    ABDOMEN SURGERY           CURRENTMEDICATIONS       Previous Medications    ACETAMINOPHEN (TYLENOL) 500 MG TABLET    Take 2 tablets by mouth 3 times daily (with meals)    ALBUTEROL SULFATE HFA (PROVENTIL HFA) 108 (90 BASE) MCG/ACT INHALER    Inhale 2 puffs into the lungs every 4 hours as needed for Wheezing or Shortness of Breath With spacer (and mask if indicated). Thanks. FAMOTIDINE (PEPCID) 20 MG TABLET    Take 1 tablet by mouth 2 times daily    HYDROCODONE-ACETAMINOPHEN (NORCO) 5-325 MG PER TABLET    Take 1 tablet by mouth every 6 hours as needed for Pain for up to 2 days. METOPROLOL SUCCINATE (TOPROL XL) 50 MG EXTENDED RELEASE TABLET    Take 1 tablet by mouth daily    NONFORMULARY    bp medication-does not know name    PREDNISONE (DELTASONE) 10 MG TABLET    5 tabs po qam for 2 days then 4,3,2,1 tabs qam for 2 days each total of 10 days         ALLERGIES     Patient has no known allergies. FAMILYHISTORY     History reviewed. No pertinent family history. SOCIAL HISTORY       Social History     Tobacco Use    Smoking status: Never Smoker    Smokeless tobacco: Never Used   Substance Use Topics    Alcohol use: No    Drug use: Yes     Types: Marijuana       SCREENINGS    Holt Coma Scale  Eye Opening: Spontaneous  Best Verbal Response: Oriented  Best Motor Response: Obeys commands  Holt Coma Scale Score: 15        PHYSICAL EXAM    (up to 7 for level 4, 8 or more for level 5)     ED Triage Vitals [08/12/21 2031]   BP Temp Temp Source Pulse Resp SpO2 Height Weight   (!) 192/108 98.5 °F (36.9 °C) Oral 133 18 99 % 5' 3\" (1.6 m) 240 lb (108.9 kg)       Physical Exam  Constitutional:       General: She is not in acute distress. Appearance: Normal appearance. She is well-developed. She is not ill-appearing, toxic-appearing or diaphoretic. HENT:      Head: Normocephalic and atraumatic. Right Ear: External ear normal.      Left Ear: External ear normal.   Eyes:      General:         Right eye: No discharge. Left eye: No discharge. Extraocular Movements: Extraocular movements intact. Conjunctiva/sclera: Conjunctivae normal.      Pupils: Pupils are equal, round, and reactive to light. Cardiovascular:      Rate and Rhythm: Regular rhythm. Tachycardia present. Pulses: Normal pulses. Heart sounds: Normal heart sounds. No murmur heard. No friction rub. No gallop. Comments: 2+ radial pulses bilaterally. No pedal edema. No calf tenderness. No JVD. Pulmonary:      Effort: Pulmonary effort is normal. No respiratory distress. Breath sounds: Normal breath sounds. No stridor. No wheezing, rhonchi or rales. Chest:      Chest wall: No tenderness. Abdominal:      General: Abdomen is flat. Bowel sounds are normal. There is no distension. Palpations: Abdomen is soft. There is no mass. Tenderness: There is no abdominal tenderness. There is no right CVA tenderness, left CVA tenderness, guarding or rebound. Hernia: No hernia is present. Musculoskeletal:         General: Normal range of motion. Cervical back: Normal range of motion and neck supple. Comments: Patient has tenderness to palpation over the left upper thoracic paraspinal musculature. Pain reproducible with palpation and movement. Patient has full range of motion strength of bilateral shoulders, elbows and wrist.  Radial pulse and capillary refill brisk. Sensation intact to radial ulnar aspects distal fingertips. Full range of motion strength in distal median, ulnar radial nerve distribution. No skin changes. No rashes or lesions. No CVA tenderness. No midline tenderness of cervical, thoracic or lumbar spine. No crepitus or step-off. Skin:     General: Skin is warm and dry. Coloration: Skin is not pale. Findings: No erythema or rash. Neurological:      General: No focal deficit present. Mental Status: She is alert and oriented to person, place, and time. Cranial Nerves: No cranial nerve deficit.    Psychiatric:         Behavior: Behavior normal.         DIAGNOSTIC RESULTS   LABS:    Labs Reviewed   CBC WITH AUTO DIFFERENTIAL - Abnormal; Notable for the following components:       Result Value    RBC 5.22 (*)     MCV 75.1 (*)     MCH 24.2 (*)     RDW 23.7 (*)     Platelets 695 (*)     All other components within normal limits    Narrative:     Performed at:  [facility] [address], [place]   Phone [phone]   COMPREHENSIVE METABOLIC PANEL W/ REFLEX TO MG FOR LOW K - Abnormal; Notable for the following components:    CO2 19 (*)     Glucose 147 (*)     Total Protein 8.9 (*)     Albumin/Globulin Ratio 0.9 (*)     AST 13 (*)     All other components within normal limits    Narrative:     Performed at:  [facility] [address], [place]   Phone [phone]   TROPONIN    Narrative:     Performed at:  [facility] [address] [employer]   Phone [phone]     Narrative:     Performed at:  [facility] [address]   Phone [phone]   HCG, SERUM, QUALITATIVE    Narrative:     Performed at:  [facility] [address]   Phone [phone]       When ordered only abnormal lab results are displayed. All other labs were within normal range or not returned as of this dictation. EKG: When ordered, EKG's are interpreted by the Emergency Department Physician in the absence of a cardiologist.  Please see their note for interpretation of EKG. RADIOLOGY:   Non-plain film images such as CT, Ultrasound and MRI are read by the radiologist. Plain radiographic images are visualized and preliminarily interpreted by the ED Provider with the below findings:        Interpretation per the Radiologist below, if available at the time of this note:    CT CHEST PULMONARY EMBOLISM W CONTRAST   Final Result   No evidence of pulmonary embolism or acute pulmonary abnormality.            XR CERVICAL SPINE The lungs are without acute process. No focal consolidation or pulmonary edema. No evidence of pleural effusion or pneumothorax. Upper Abdomen: Stable hypodense lesions in the spleen, which may represent cysts or hemangiomas. Soft Tissues/Bones: No acute bone or soft tissue abnormality. Multilevel thoracic spondylosis. No     No evidence of pulmonary embolism or acute pulmonary abnormality. PROCEDURES   Unless otherwise noted below, none     Procedures    CRITICAL CARE TIME   N/A    CONSULTS:  None      EMERGENCY DEPARTMENT COURSE and DIFFERENTIAL DIAGNOSIS/MDM:   Vitals:    Vitals:    08/13/21 0000 08/13/21 0015 08/13/21 0030 08/13/21 0040   BP: (!) 168/94 (!) 160/87 (!) 161/93    Pulse:    103   Resp:       Temp:       TempSrc:       SpO2: 98% 98% 97%    Weight:       Height:           Patient was given the following medications:  Medications   0.9 % sodium chloride bolus (1,000 mLs Intravenous New Bag 8/13/21 0005)   ondansetron (ZOFRAN) injection 4 mg (4 mg Intravenous Given 8/13/21 0041)   morphine (PF) injection 2 mg (2 mg Intravenous Given 8/13/21 0041)   iopamidol (ISOVUE-370) 76 % injection 75 mL (75 mLs Intravenous Given 8/12/21 2251)           Patient is a 49-year-old female who presents to the ED implant left upper back pain. Complaint of pain to her left upper back to raise on her left arm. Appears to be musculoskeletal based on examination here in the ED but patient states she did have recent travel to and from [address] [place] [address] on train. Came to the ED was tachycardic with what appeared to be sinus tachycardia with heart rate in the 130s. IV established and blood work obtained. EKG inter by attending. CBC showed normal white count, hemoglobin and platelets of 000. CMP unremarkable. Troponin normal.  BNP normal.  Pregnancy was negative. CT of the chest showed no acute abnormality.   Given patient's history and physical examination patient likely some from left upper back pain which I believe most likely musculoskeletal.  Around pain medication with steroids at home. Patient states she feels much better upon repeat evaluation. Heart rate did improve. Heart rate most likely secondary to palpitation secondary to medication side effect. Patient instructed not to take 2 doses of the pain medication at home. Patient states she will just take 1 and then take some Motrin. Believe this is reasonable. Follow-up with PCP. Return to the ED for any worsening symptoms. Low suspicion for ACS, PE, dissection, AAA, pneumonia, pneumothorax respiratory stress, GERD, CHF, COPD, asthma, surgical abdomen, acute fracture/dislocation, cauda equina, pleural abscess, spinal stenosis, cord compression, AAA, dissection, pyelonephritis, nephrolithiasis, retroperitoneal bleed or other emergent etiology at this time. FINAL IMPRESSION      1. Upper back pain on left side    2. Adverse drug effect, initial encounter          DISPOSITION/PLAN   DISPOSITION Decision To Discharge 08/13/2021 01:21:59 AM      PATIENT REFERRED TO:  Helen Ryder MD  [address] [place], [place] [id number] [address]  [phone]    Schedule an appointment as soon as possible for a visit   For a Re-check in    5-7  days.     [facility] Emergency Department  [address] [facility]  [address] [place] [address]  [phone]  Go to   As needed, If symptoms worsen      DISCHARGE MEDICATIONS:  New Prescriptions    No medications on file       DISCONTINUED MEDICATIONS:  Discontinued Medications    No medications on file              (Please note that portions of this note were completed with a voice recognition program.  Efforts were made to edit the dictations but occasionally words are mis-transcribed.)    MACKENZIE Rahman (electronically signed)          MACKENZIE Carmona  08/13/21 0123

## 2022-06-03 ENCOUNTER — HOSPITAL ENCOUNTER (EMERGENCY)
Age: 52
Discharge: HOME | End: 2022-06-04
Attending: EMERGENCY MEDICINE
Payer: MEDICARE

## 2022-06-03 ENCOUNTER — APPOINTMENT (OUTPATIENT)
Dept: GENERAL RADIOLOGY | Age: 52
End: 2022-06-03
Payer: MEDICARE

## 2022-06-03 VITALS
SYSTOLIC BLOOD PRESSURE: 146 MMHG | HEART RATE: 86 BPM | OXYGEN SATURATION: 100 % | HEIGHT: 63 IN | BODY MASS INDEX: 43.75 KG/M2 | DIASTOLIC BLOOD PRESSURE: 91 MMHG | WEIGHT: 246.91 LBS | RESPIRATION RATE: 14 BRPM | TEMPERATURE: 98.3 F

## 2022-06-03 DIAGNOSIS — F41.1 ANXIETY REACTION: ICD-10-CM

## 2022-06-03 DIAGNOSIS — I10 ESSENTIAL HYPERTENSION: ICD-10-CM

## 2022-06-03 DIAGNOSIS — R06.00 DYSPNEA, UNSPECIFIED TYPE: Primary | ICD-10-CM

## 2022-06-03 LAB
A/G RATIO: 1.1 (ref 1.1–2.2)
ALBUMIN SERPL-MCNC: 4.2 G/DL (ref 3.4–5)
ALP BLD-CCNC: 75 U/L (ref 40–129)
ALT SERPL-CCNC: 9 U/L (ref 10–40)
ANION GAP SERPL CALCULATED.3IONS-SCNC: 17 MMOL/L (ref 3–16)
AST SERPL-CCNC: 12 U/L (ref 15–37)
BASOPHILS ABSOLUTE: 0.1 K/UL (ref 0–0.2)
BASOPHILS RELATIVE PERCENT: 1 %
BILIRUB SERPL-MCNC: 0.3 MG/DL (ref 0–1)
BUN BLDV-MCNC: 10 MG/DL (ref 7–20)
CALCIUM SERPL-MCNC: 9.3 MG/DL (ref 8.3–10.6)
CHLORIDE BLD-SCNC: 103 MMOL/L (ref 99–110)
CO2: 19 MMOL/L (ref 21–32)
CREAT SERPL-MCNC: 0.7 MG/DL (ref 0.6–1.1)
D DIMER: <0.27 UG/ML FEU (ref 0–0.6)
EOSINOPHILS ABSOLUTE: 0.2 K/UL (ref 0–0.6)
EOSINOPHILS RELATIVE PERCENT: 2.4 %
GFR AFRICAN AMERICAN: >60
GFR NON-AFRICAN AMERICAN: >60
GLUCOSE BLD-MCNC: 94 MG/DL (ref 70–99)
HCT VFR BLD CALC: 33.6 % (ref 36–48)
HEMOGLOBIN: 10.9 G/DL (ref 12–16)
LYMPHOCYTES ABSOLUTE: 2.2 K/UL (ref 1–5.1)
LYMPHOCYTES RELATIVE PERCENT: 32.2 %
MCH RBC QN AUTO: 24.9 PG (ref 26–34)
MCHC RBC AUTO-ENTMCNC: 32.6 G/DL (ref 31–36)
MCV RBC AUTO: 76.6 FL (ref 80–100)
MONOCYTES ABSOLUTE: 0.3 K/UL (ref 0–1.3)
MONOCYTES RELATIVE PERCENT: 4.7 %
NEUTROPHILS ABSOLUTE: 4 K/UL (ref 1.7–7.7)
NEUTROPHILS RELATIVE PERCENT: 59.7 %
PDW BLD-RTO: 18.5 % (ref 12.4–15.4)
PLATELET # BLD: 397 K/UL (ref 135–450)
PMV BLD AUTO: 7.5 FL (ref 5–10.5)
POTASSIUM SERPL-SCNC: 3.9 MMOL/L (ref 3.5–5.1)
PRO-BNP: 14 PG/ML (ref 0–124)
RBC # BLD: 4.38 M/UL (ref 4–5.2)
SODIUM BLD-SCNC: 139 MMOL/L (ref 136–145)
TOTAL PROTEIN: 7.9 G/DL (ref 6.4–8.2)
TROPONIN: <0.01 NG/ML
TROPONIN: <0.01 NG/ML
WBC # BLD: 6.8 K/UL (ref 4–11)

## 2022-06-03 PROCEDURE — 80053 COMPREHEN METABOLIC PANEL: CPT

## 2022-06-03 PROCEDURE — 99285 EMERGENCY DEPT VISIT HI MDM: CPT

## 2022-06-03 PROCEDURE — 84484 ASSAY OF TROPONIN QUANT: CPT

## 2022-06-03 PROCEDURE — 71046 X-RAY EXAM CHEST 2 VIEWS: CPT

## 2022-06-03 PROCEDURE — 83880 ASSAY OF NATRIURETIC PEPTIDE: CPT

## 2022-06-03 PROCEDURE — 93005 ELECTROCARDIOGRAM TRACING: CPT | Performed by: PHYSICIAN ASSISTANT

## 2022-06-03 PROCEDURE — 85025 COMPLETE CBC W/AUTO DIFF WBC: CPT

## 2022-06-03 PROCEDURE — 36415 COLL VENOUS BLD VENIPUNCTURE: CPT

## 2022-06-03 PROCEDURE — 85379 FIBRIN DEGRADATION QUANT: CPT

## 2022-06-03 ASSESSMENT — ENCOUNTER SYMPTOMS
COUGH: 0
COLOR CHANGE: 0
SHORTNESS OF BREATH: 1
ABDOMINAL PAIN: 0

## 2022-06-03 NOTE — Clinical Note
Mat Badillo was seen and treated in our emergency department on 6/3/2022. She may return to work on 06/06/2022. If you have any questions or concerns, please don't hesitate to call.       Laurie Avila, DO

## 2022-06-03 NOTE — ED PROVIDER NOTES
[address] [facility]      Pt Name: Alejandro Arriaga  MRN: [id number]  [place] 1970  Date of evaluation: 6/3/2022  Provider: MACKENZIE Mae    This patient was seen and evaluated by the attending physician Dr. Brian Conway. CHIEF COMPLAINT       Chief Complaint   Patient presents with    Other     onset this am.  denies cough. ear pain. congestion. denies fever. pt states she may be having increased anxiety       CRITICAL CARE TIME   I performed a total Critical Care time of 15 minutes, excluding separately reportable procedures. There was a high probability of clinically significant/life threatening deterioration in the patient's condition which required my urgent intervention. Not limited to multiple reexaminations, discussions with attending physician and consultants. HISTORY OF PRESENT ILLNESS  (Location/Symptom, Timing/Onset, Context/Setting, Quality, Duration, Modifying Factors, Severity.)   Alejandro Arriaga is a 46 y.o. female who presents to the emergency department complaining of a short of breath sensation. She states she feels like it is harder to take a deep breath. Started this morning. She states she had similar symptoms in the past that she relates to anxiety but typically if she \"prays and takes deep breaths it goes away. \"  Persisted today. Admittedly she has not gotten her blood pressure medications filled since September. Typically takes metoprolol. She got it filled today when she started having the symptoms and took a dose of metoprolol 50 mg ER just prior to arrival in the ER. Denies known personal or family history of CAD, DVT or PE. No calf tenderness or leg swelling. No abdominal pain. Nursing Notes were reviewed and I agree. REVIEW OF SYSTEMS    (2-9 systems for level 4, 10 or more for level 5)     Review of Systems   Constitutional: Negative for fever. Respiratory: Positive for shortness of breath. Negative for cough. Cardiovascular: Negative for chest pain and leg swelling. Gastrointestinal: Negative for abdominal pain. Skin: Negative for color change, rash and wound. Neurological: Negative for weakness. Psychiatric/Behavioral: Negative for agitation, behavioral problems and confusion. Except as noted above the remainder of the review of systems was reviewed and negative. PAST MEDICAL HISTORY         Diagnosis Date    Asthma        SURGICAL HISTORY           Procedure Laterality Date    ABDOMEN SURGERY         CURRENT MEDICATIONS       Previous Medications    ACETAMINOPHEN (TYLENOL) 500 MG TABLET    Take 2 tablets by mouth 3 times daily (with meals)    ALBUTEROL SULFATE HFA (PROVENTIL HFA) 108 (90 BASE) MCG/ACT INHALER    Inhale 2 puffs into the lungs every 4 hours as needed for Wheezing or Shortness of Breath With spacer (and mask if indicated). Thanks. FAMOTIDINE (PEPCID) 20 MG TABLET    Take 1 tablet by mouth 2 times daily    METOPROLOL SUCCINATE (TOPROL XL) 50 MG EXTENDED RELEASE TABLET    Take 1 tablet by mouth daily    NONFORMULARY    bp medication-does not know name       ALLERGIES     Patient has no known allergies. FAMILY HISTORY     No family history on file. No family status information on file. SOCIAL HISTORY      reports that she has never smoked. She has never used smokeless tobacco. She reports current drug use. Drug: Marijuana Kole Montes). She reports that she does not drink alcohol. PHYSICAL EXAM    (up to 7 for level 4, 8 or more for level 5)     ED Triage Vitals [06/03/22 1710]   BP Temp Temp Source Heart Rate Resp SpO2 Height Weight   (!) 196/102 98.3 °F (36.8 °C) Tympanic (!) 108 18 99 % -- 246 lb 14.6 oz (112 kg)       Physical Exam  Vitals and nursing note reviewed. Constitutional:       Appearance: Normal appearance. HENT:      Head: Normocephalic and atraumatic.       Mouth/Throat:      Mouth: Mucous membranes are moist.   Eyes:      Pupils: Pupils are equal, round, and reactive to light. Cardiovascular:      Rate and Rhythm: Tachycardia present. Pulses: Normal pulses. Pulmonary:      Effort: Pulmonary effort is normal. No respiratory distress. Abdominal:      Tenderness: There is no abdominal tenderness. There is no guarding. Musculoskeletal:         General: No swelling or tenderness. Normal range of motion. Cervical back: Normal range of motion. Skin:     General: Skin is warm. Neurological:      General: No focal deficit present. Mental Status: She is alert and oriented to person, place, and time. Psychiatric:         Mood and Affect: Mood normal.         Behavior: Behavior normal.         DIAGNOSTIC RESULTS     EKG: All EKG's are interpreted by MACKENZIE Hamilton in the absence of a cardiologist.    EKG interpreted by myself - please refer to attending physician's note for complete EKG interpretation:    No evidence of acute ischemia or injury. RADIOLOGY:   Non-plain film images such as CT, Ultrasound and MRI are read by the radiologist. Plain radiographic images are visualized and preliminarily interpreted by MACKENZIE Hamilton with the below findings:    Reviewed radiologist's interpretation. Interpretation per the Radiologist below, if available at the time of this note:    XR CHEST (2 VW)   Final Result   No acute cardiopulmonary disease.                LABS:  Labs Reviewed   CBC WITH AUTO DIFFERENTIAL - Abnormal; Notable for the following components:       Result Value    Hemoglobin 10.9 (*)     Hematocrit 33.6 (*)     MCV 76.6 (*)     MCH 24.9 (*)     RDW 18.5 (*)     All other components within normal limits   COMPREHENSIVE METABOLIC PANEL - Abnormal; Notable for the following components:    CO2 19 (*)     Anion Gap 17 (*)     ALT 9 (*)     AST 12 (*)     All other components within normal limits   TROPONIN   BRAIN NATRIURETIC PEPTIDE   D-DIMER, QUANTITATIVE   TROPONIN       All other labs were within normal range or not returned as of this dictation. EMERGENCY DEPARTMENT COURSE and DIFFERENTIAL DIAGNOSIS/MDM:   Vitals:    Vitals:    06/03/22 2115 06/03/22 2200 06/03/22 2300 06/03/22 2312   BP: (!) 171/108 (!) 141/82 (!) 146/91    Pulse: 99 100 86    Resp: 14 26 14    Temp:       TempSrc:       SpO2: 98% 100% 100%    Weight:    246 lb 14.6 oz (112 kg)   Height:    5' 3\" (1.6 m)     I discussed with Randi Torres and/or family the exam results, diagnosis, care, prognosis, reasons to return and the importance of follow up. Patient and/or family is in full agreement with plan and all questions have been answered. Specific discharge instructions explained, including reasons to return to the emergency department. Randi Torres is well appearing, non-toxic, and afebrile at the time of discharge. Patient is afebrile initially tachycardic on recheck pulses normal.  Blood pressure initially elevated to 196 on recheck is 146. The patient has not been taking her blood pressure medications took a dose just prior to arrival in the ER and now the blood pressure is coming down. Initial and repeat troponins are not elevated. Instructed to follow-up with her primary care and return for new, worsening or other concerns. D-dimer is not elevated chest x-ray clear. I estimate there is LOW risk for PULMONARY EMBOLISM, ACUTE CORONARY SYNDROME, OR THORACIC AORTIC DISSECTION, thus I consider the discharge disposition reasonable. Is this patient to be included in the SEP-1 Core Measure due to severe sepsis or septic shock? No   Exclusion criteria - the patient is NOT to be included for SEP-1 Core Measure due to: Infection is not suspected     CONSULTS:  None    PROCEDURES:  Procedures      FINAL IMPRESSION      1. Dyspnea, unspecified type    2. Anxiety reaction    3.  Essential hypertension          DISPOSITION/PLAN   DISPOSITION Decision To Discharge 06/03/2022 11:09:48 PM      PATIENT REFERRED TO:  Hira Hernández MD  [address]  [id number]  [facility] [address]    Call today        DISCHARGE MEDICATIONS:  New Prescriptions    No medications on file       (Please note that portions of this note were completed with a voice recognition program.  Efforts were made to edit the dictations but occasionally words are mis-transcribed.)    Jai Purcell, [address], [place]  06/03/22 8609

## 2022-06-04 LAB
EKG ATRIAL RATE: 108 BPM
EKG DIAGNOSIS: NORMAL
EKG P AXIS: 53 DEGREES
EKG P-R INTERVAL: 144 MS
EKG Q-T INTERVAL: 334 MS
EKG QRS DURATION: 68 MS
EKG QTC CALCULATION (BAZETT): 447 MS
EKG R AXIS: -19 DEGREES
EKG T AXIS: 4 DEGREES
EKG VENTRICULAR RATE: 108 BPM

## 2022-06-04 PROCEDURE — 93010 ELECTROCARDIOGRAM REPORT: CPT | Performed by: INTERNAL MEDICINE

## 2022-06-04 NOTE — ED PROVIDER NOTES
Attending Note:    The patient was seen and examined by the mid-level provider. I also completed a face-to-face examination. HPI: Shahbaz Whittington is a 46 y.o. female who presents to the emergency department with a complaint of shortness of breath. She reports that she woke up this morning and felt like her breathing was labored. She does have a history of asthma but did not notice any wheezing. She denies any cough, sputum production, fever chills nausea vomiting or diarrhea. She denies any headache earache sore throat or sinus drainage. No exposure to illness. She states that as the morning went on she became more anxious about her symptoms. She states that she does have a significant history of anxiety and she suspects that this made the symptoms worse. She has been under some stress recently. No report of any suicidal or homicidal ideations. No auditory or visual hallucinations. She denies any personal or family history of coronary artery disease or thromboembolic disease. No leg or calf pain. No recent travel. She does not smoke. She does have a history of hypertension and states that she has been off of her medication for several months. She went and got her metoprolol 50 mg XL filled this evening and took a dose before she came to the hospital.  Vinny Blight any family history of heart disease, hyperlipidemia, or diabetes. She denies any associated chest pain, heaviness pressure or tightness. No diaphoresis. She denies any syncope or palpitations. Physical Exam:     Constitutional: No apparent distress. Pleasant. Appears calm. Head: No visible evidence of trauma. Normocephalic. Eyes: Pupils equal and reactive. No photophobia. Conjunctiva normal.    HENT: Oral mucosa moist.  Airway patent. Neck:  Soft and supple. Nontender. Heart:  Regular rate and rhythm. No murmur. Sinus rhythm on the monitor. Rate 89. Lungs:  Clear to auscultation. No wheezes, rales, or ronchi.   No conversational dyspnea or accessory muscle use. Abdomen:  Soft, non-distended. Nontender. No guarding rigidity or rebound. Musculoskeletal: Extremities non-tender with full range of motion. Dorsalis pedis pulses were equal bilaterally. No calf tenderness erythema or edema. Neurological: Alert and oriented x 3. Speech clear. No acute focal motor or sensory deficits. Skin: Skin is warm and dry. No rash. Psychiatric: Normal mood and affect. Behavior is normal.     DIAGNOSTIC RESULTS     EKG: All EKG's are interpreted by the Emergency Department Physician who either signs or Co-signs this chart in the absence of a cardiologist.    Sinus tachycardia. Rate 108. FL interval 144 ms. QRS duration 68 ms. QTc 447 ms.  R axis -19 degrees. No ST elevation. Minimal criteria for LVH. Q waves noted in the inferior leads. RADIOLOGY:   Non-plain film images such as CT, Ultrasound and MRI are read by the radiologist. Plain radiographic images are visualized and preliminarily interpreted by the emergency physician with the below findings:        Interpretation per the Radiologist below, if available at the time of this note:    XR CHEST (2 VW)   Final Result   No acute cardiopulmonary disease. ED BEDSIDE ULTRASOUND:   Performed by ED Physician - none    LABS:  Labs Reviewed   CBC WITH AUTO DIFFERENTIAL - Abnormal; Notable for the following components:       Result Value    Hemoglobin 10.9 (*)     Hematocrit 33.6 (*)     MCV 76.6 (*)     MCH 24.9 (*)     RDW 18.5 (*)     All other components within normal limits   COMPREHENSIVE METABOLIC PANEL - Abnormal; Notable for the following components:    CO2 19 (*)     Anion Gap 17 (*)     ALT 9 (*)     AST 12 (*)     All other components within normal limits   TROPONIN   BRAIN NATRIURETIC PEPTIDE   D-DIMER, QUANTITATIVE   TROPONIN       All other labs were within normal range or not returned as of this dictation.     EMERGENCY DEPARTMENT COURSE and DIFFERENTIAL DIAGNOSIS/MDM:   Vitals:    Vitals:    06/03/22 1710 06/03/22 1841 06/03/22 2115 06/03/22 2200   BP: (!) 196/102 (!) 169/96 (!) 171/108 (!) 141/82   Pulse: (!) 108 (!) 108 99 100   Resp: 18  14 26   Temp: 98.3 °F (36.8 °C)      TempSrc: Tympanic      SpO2: 99%  98% 100%   Weight: 246 lb 14.6 oz (112 kg)          I personally saw and performed a substantive portion of the visit including all aspects of the medical decision making. The patient presents with some mild shortness of breath and anxiousness since waking this morning. Oxygen saturation is 100% on room air. She is hemodynamically stable. She was hypertensive at the time of initial exam but had just taken her metoprolol prior to coming to the hospital.  At the time of my exam her blood pressure is 155/90. Her symptoms have resolved. EKG reveals normal sinus rhythm. No acute change. Troponin and D-dimer are normal.    I suspect that there was likely an anxiety component to her symptoms but her symptoms may have been precipitated by a spike in her blood pressure. No suspicion for acute coronary syndrome at this time. No associated chest discomfort. D-dimer is normal.  No suspicion for pulmonary embolus. If repeat 3-hour troponin is negative, she will be able to be discharged home. She is stable for outpatient management. Advised her to follow-up with her primary care physician within 2 to 3 days for recheck on her blood pressure. She was advised of the importance of taking her blood pressure medication as prescribed. If her condition worsens or new symptoms develop, she was advised to return immediately to the emergency department. MDM      CRITICAL CARE TIME     I personally saw the patient and independently provided 0 minutes of non-concurrent critical care out of the total shared critical care time provided. This excludes separately reportable procedures.     There was a high probability of clinically significant/life threatening deterioration in the patient's condition which required my urgent intervention. CONSULTS:  None    PROCEDURES:  Unless otherwise noted below, none     Procedures        FINAL IMPRESSION      1. Dyspnea, unspecified type    2. Anxiety reaction    3. Essential hypertension          DISPOSITION/PLAN   DISPOSITION        PATIENT REFERRED TO:  Luis Manuel Doyle MD  [address] [place] [address]  [id number]  [place] [address]  [phone]    Call today        DISCHARGE MEDICATIONS:  New Prescriptions    No medications on file     Controlled Substances Monitoring:     RX Monitoring 1/12/2021   Periodic Controlled Substance Monitoring No signs of potential drug abuse or diversion identified. (Please note that portions of this note were completed with a voice recognition program.  Efforts were made to edit the dictations but occasionally words are mis-transcribed. )    18[address] Nash Vang DO (electronically signed)  Attending Emergency Physician      Brit Haley DO  06/03/22 2713

## 2022-06-04 NOTE — ED NOTES
Pt reports she has not been taking home BP meds for approximately last 3 months, did call for refill today. States she does have occas anxiety issues, but has coping mechanisms that typically work, yet have not been working today. States she feels as if she is breathing too fast and needs to take a deep breath and then the Jeffrie Noss feels really heavy\" and that there is \"pressure\" in her head and ears. Pt awake, alert and oriented x 4 at this time.  Denies pain      Adelaida Aguirre RN  06/03/22 7633

## 2022-08-23 ENCOUNTER — HOSPITAL ENCOUNTER (EMERGENCY)
Age: 52
Discharge: LEFT BEFORE DISCHARGE | End: 2022-08-23

## 2022-08-29 ENCOUNTER — HOSPITAL ENCOUNTER (EMERGENCY)
Age: 52
Discharge: HOME | End: 2022-08-29
Payer: MEDICARE

## 2022-08-29 ENCOUNTER — APPOINTMENT (OUTPATIENT)
Dept: CT IMAGING | Age: 52
End: 2022-08-29
Payer: MEDICARE

## 2022-08-29 VITALS
WEIGHT: 240 LBS | SYSTOLIC BLOOD PRESSURE: 175 MMHG | HEART RATE: 111 BPM | TEMPERATURE: 98.2 F | HEIGHT: 63 IN | DIASTOLIC BLOOD PRESSURE: 114 MMHG | RESPIRATION RATE: 16 BRPM | OXYGEN SATURATION: 98 % | BODY MASS INDEX: 42.52 KG/M2

## 2022-08-29 DIAGNOSIS — R05.9 COUGH: Primary | ICD-10-CM

## 2022-08-29 DIAGNOSIS — R07.81 RIB PAIN ON LEFT SIDE: ICD-10-CM

## 2022-08-29 DIAGNOSIS — R93.89 THICKENED ENDOMETRIUM: ICD-10-CM

## 2022-08-29 LAB
A/G RATIO: 1 (ref 1.1–2.2)
ALBUMIN SERPL-MCNC: 4 G/DL (ref 3.4–5)
ALP BLD-CCNC: 82 U/L (ref 40–129)
ALT SERPL-CCNC: 11 U/L (ref 10–40)
ANION GAP SERPL CALCULATED.3IONS-SCNC: 13 MMOL/L (ref 3–16)
AST SERPL-CCNC: 17 U/L (ref 15–37)
BACTERIA: ABNORMAL /HPF
BASOPHILS ABSOLUTE: 0.1 K/UL (ref 0–0.2)
BASOPHILS RELATIVE PERCENT: 1.4 %
BILIRUB SERPL-MCNC: <0.2 MG/DL (ref 0–1)
BILIRUBIN URINE: NEGATIVE
BLOOD, URINE: NEGATIVE
BUN BLDV-MCNC: 13 MG/DL (ref 7–20)
CALCIUM SERPL-MCNC: 9.6 MG/DL (ref 8.3–10.6)
CHLORIDE BLD-SCNC: 103 MMOL/L (ref 99–110)
CLARITY: ABNORMAL
CO2: 23 MMOL/L (ref 21–32)
COLOR: YELLOW
CREAT SERPL-MCNC: 0.7 MG/DL (ref 0.6–1.1)
EOSINOPHILS ABSOLUTE: 0.5 K/UL (ref 0–0.6)
EOSINOPHILS RELATIVE PERCENT: 6.3 %
EPITHELIAL CELLS, UA: 23 /HPF (ref 0–5)
GFR AFRICAN AMERICAN: >60
GFR NON-AFRICAN AMERICAN: >60
GLUCOSE BLD-MCNC: 88 MG/DL (ref 70–99)
GLUCOSE URINE: NEGATIVE MG/DL
HCG QUALITATIVE: NEGATIVE
HCT VFR BLD CALC: 35 % (ref 36–48)
HEMOGLOBIN: 10.9 G/DL (ref 12–16)
HYALINE CASTS: 0 /LPF (ref 0–8)
KETONES, URINE: NEGATIVE MG/DL
LEUKOCYTE ESTERASE, URINE: ABNORMAL
LIPASE: 27 U/L (ref 13–60)
LYMPHOCYTES ABSOLUTE: 2.5 K/UL (ref 1–5.1)
LYMPHOCYTES RELATIVE PERCENT: 32.4 %
MCH RBC QN AUTO: 24.8 PG (ref 26–34)
MCHC RBC AUTO-ENTMCNC: 31.1 G/DL (ref 31–36)
MCV RBC AUTO: 79.6 FL (ref 80–100)
MICROSCOPIC EXAMINATION: YES
MONOCYTES ABSOLUTE: 0.6 K/UL (ref 0–1.3)
MONOCYTES RELATIVE PERCENT: 7.4 %
NEUTROPHILS ABSOLUTE: 4 K/UL (ref 1.7–7.7)
NEUTROPHILS RELATIVE PERCENT: 52.5 %
NITRITE, URINE: NEGATIVE
PDW BLD-RTO: 18.1 % (ref 12.4–15.4)
PH UA: 6 (ref 5–8)
PLATELET # BLD: 451 K/UL (ref 135–450)
PMV BLD AUTO: 7.4 FL (ref 5–10.5)
POTASSIUM REFLEX MAGNESIUM: 3.9 MMOL/L (ref 3.5–5.1)
PRO-BNP: 41 PG/ML (ref 0–124)
PROTEIN UA: NEGATIVE MG/DL
RBC # BLD: 4.4 M/UL (ref 4–5.2)
RBC UA: 0 /HPF (ref 0–4)
SODIUM BLD-SCNC: 139 MMOL/L (ref 136–145)
SPECIFIC GRAVITY UA: <=1.005 (ref 1–1.03)
TOTAL PROTEIN: 8.2 G/DL (ref 6.4–8.2)
TROPONIN: <0.01 NG/ML
URINE REFLEX TO CULTURE: YES
URINE TYPE: ABNORMAL
UROBILINOGEN, URINE: 0.2 E.U./DL
WBC # BLD: 7.7 K/UL (ref 4–11)
WBC UA: 11 /HPF (ref 0–5)

## 2022-08-29 PROCEDURE — 6370000000 HC RX 637 (ALT 250 FOR IP): Performed by: PHYSICIAN ASSISTANT

## 2022-08-29 PROCEDURE — 99285 EMERGENCY DEPT VISIT HI MDM: CPT

## 2022-08-29 PROCEDURE — 87086 URINE CULTURE/COLONY COUNT: CPT

## 2022-08-29 PROCEDURE — 93005 ELECTROCARDIOGRAM TRACING: CPT | Performed by: PHYSICIAN ASSISTANT

## 2022-08-29 PROCEDURE — 94760 N-INVAS EAR/PLS OXIMETRY 1: CPT

## 2022-08-29 PROCEDURE — 94640 AIRWAY INHALATION TREATMENT: CPT

## 2022-08-29 PROCEDURE — 81001 URINALYSIS AUTO W/SCOPE: CPT

## 2022-08-29 PROCEDURE — 6360000004 HC RX CONTRAST MEDICATION: Performed by: PHYSICIAN ASSISTANT

## 2022-08-29 PROCEDURE — 80053 COMPREHEN METABOLIC PANEL: CPT

## 2022-08-29 PROCEDURE — 84484 ASSAY OF TROPONIN QUANT: CPT

## 2022-08-29 PROCEDURE — 85025 COMPLETE CBC W/AUTO DIFF WBC: CPT

## 2022-08-29 PROCEDURE — 71260 CT THORAX DX C+: CPT | Performed by: PHYSICIAN ASSISTANT

## 2022-08-29 PROCEDURE — 83880 ASSAY OF NATRIURETIC PEPTIDE: CPT

## 2022-08-29 PROCEDURE — 84703 CHORIONIC GONADOTROPIN ASSAY: CPT

## 2022-08-29 PROCEDURE — 6360000002 HC RX W HCPCS: Performed by: PHYSICIAN ASSISTANT

## 2022-08-29 PROCEDURE — 83690 ASSAY OF LIPASE: CPT

## 2022-08-29 PROCEDURE — 74177 CT ABD & PELVIS W/CONTRAST: CPT

## 2022-08-29 RX ORDER — GUAIFENESIN AND CODEINE PHOSPHATE 100; 10 MG/5ML; MG/5ML
5 SOLUTION ORAL 3 TIMES DAILY PRN
Qty: 75 ML | Refills: 0 | Status: SHIPPED | OUTPATIENT
Start: 2022-08-29 | End: 2022-09-03

## 2022-08-29 RX ORDER — DEXAMETHASONE SODIUM PHOSPHATE 10 MG/ML
10 INJECTION, SOLUTION INTRAMUSCULAR; INTRAVENOUS ONCE
Status: COMPLETED | OUTPATIENT
Start: 2022-08-29 | End: 2022-08-29

## 2022-08-29 RX ORDER — IPRATROPIUM BROMIDE AND ALBUTEROL SULFATE 2.5; .5 MG/3ML; MG/3ML
1 SOLUTION RESPIRATORY (INHALATION) ONCE
Status: COMPLETED | OUTPATIENT
Start: 2022-08-29 | End: 2022-08-29

## 2022-08-29 RX ORDER — PREDNISONE 10 MG/1
60 TABLET ORAL DAILY
Qty: 30 TABLET | Refills: 0 | Status: SHIPPED | OUTPATIENT
Start: 2022-08-29 | End: 2022-09-03

## 2022-08-29 RX ORDER — ALBUTEROL SULFATE 90 UG/1
AEROSOL, METERED RESPIRATORY (INHALATION)
Qty: 18 G | Refills: 0 | Status: SHIPPED | OUTPATIENT
Start: 2022-08-29

## 2022-08-29 RX ADMIN — IOPAMIDOL 75 ML: 755 INJECTION, SOLUTION INTRAVENOUS at 18:04

## 2022-08-29 RX ADMIN — IPRATROPIUM BROMIDE AND ALBUTEROL SULFATE 1 AMPULE: .5; 2.5 SOLUTION RESPIRATORY (INHALATION) at 18:33

## 2022-08-29 RX ADMIN — DEXAMETHASONE SODIUM PHOSPHATE 10 MG: 10 INJECTION, SOLUTION INTRAMUSCULAR; INTRAVENOUS at 17:21

## 2022-08-29 ASSESSMENT — PAIN DESCRIPTION - LOCATION: LOCATION: CHEST

## 2022-08-29 ASSESSMENT — ENCOUNTER SYMPTOMS
NAUSEA: 0
PHOTOPHOBIA: 0
SHORTNESS OF BREATH: 1
ABDOMINAL PAIN: 1
BACK PAIN: 0
CONSTIPATION: 0
COLOR CHANGE: 0
CHEST TIGHTNESS: 1
DIARRHEA: 0
VOMITING: 0
COUGH: 1

## 2022-08-29 ASSESSMENT — PAIN DESCRIPTION - ORIENTATION: ORIENTATION: LEFT

## 2022-08-29 ASSESSMENT — PAIN - FUNCTIONAL ASSESSMENT: PAIN_FUNCTIONAL_ASSESSMENT: 0-10

## 2022-08-29 ASSESSMENT — PAIN SCALES - GENERAL: PAINLEVEL_OUTOF10: 8

## 2022-08-29 NOTE — ED PROVIDER NOTES
[address] [facility]        Pt Name: Ellyn Wolff  MRN: [id number]  [place] 1970  Date of evaluation: 8/29/2022  Provider: MACKENZIE Caputo  PCP: Kwesi Soto MD  Note Started: 5:55 PM EDT       JACLYN. I have evaluated this patient. My supervising physician was available for consultation. CHIEF COMPLAINT       Chief Complaint   Patient presents with    Cough     Cough x 2 weeks, \"I'm getting over something\", new complaint of tearing/burning sensation around left side of chest wrapping around to back. HISTORY OF PRESENT ILLNESS   (Location, Timing/Onset, Context/Setting, Quality, Duration, Modifying Factors, Severity, Associated Signs and Symptoms)  Note limiting factors. Chief Complaint: David Wolff is a 46 y.o. female with past medical history of asthma and hypertension who presents to the ED with complaint of a cough. Patient states she has had a cough productive of Thick sputum for the past 2 weeks. States she called her PCP who caught her and cough medication 2 weeks ago. States has not helped. States continually coughing and over the past week has had A tearing/burning pain to the left upper quadrant that radiates around the left flank and into the back. States also to the left rib cage. States worsened when she takes a deep breath. Denies any hemoptysis. Denies history of DVT or PE. Denies any recent travel, trips, surgery or immobilization. Denies any orthopnea, pedal edema or calf tenderness. Denies nausea, vomiting, urinary symptoms or changes in bowel movements. Denies fever or chills. Denies any rashes or lesions. Denies sick contacts or recent travel. Denies headache, rhinorrhea, congestion or lightheadedness/dizziness. Denies any syncope or near syncope. Nursing Notes were all reviewed and agreed with or any disagreements were addressed in the HPI.     REVIEW OF SYSTEMS    (2-9 systems for level 4, 10 or more for level 5)     Review of Systems   Constitutional:  Negative for activity change, appetite change, chills, diaphoresis, fatigue and fever. Eyes:  Negative for photophobia and visual disturbance. Respiratory:  Positive for cough, chest tightness and shortness of breath. Cardiovascular:  Positive for chest pain. Negative for palpitations and leg swelling. Gastrointestinal:  Positive for abdominal pain. Negative for constipation, diarrhea, nausea and vomiting. Genitourinary:  Negative for decreased urine volume, difficulty urinating, dysuria, flank pain, frequency, hematuria and urgency. Musculoskeletal:  Negative for arthralgias, back pain, myalgias, neck pain and neck stiffness. Skin:  Negative for color change, pallor, rash and wound. Neurological:  Negative for dizziness, light-headedness and headaches. Positives and Pertinent negatives as per HPI. Except as noted above in the ROS, all other systems were reviewed and negative. PAST MEDICAL HISTORY     Past Medical History:   Diagnosis Date    Asthma     Hypertension          SURGICAL HISTORY     Past Surgical History:   Procedure Laterality Date    ABDOMEN SURGERY       SECTION           CURRENTMEDICATIONS       Discharge Medication List as of 2022  8:37 PM        CONTINUE these medications which have NOT CHANGED    Details   acetaminophen (TYLENOL) 500 MG tablet Take 2 tablets by mouth 3 times daily (with meals), Disp-30 tablet, R-0Print      metoprolol succinate (TOPROL XL) 50 MG extended release tablet Take 1 tablet by mouth daily, Disp-30 tablet, R-0Print      famotidine (PEPCID) 20 MG tablet Take 1 tablet by mouth 2 times daily, Disp-60 tablet, R-0Print      NONFORMULARY bp medication-does not know nameHistorical Med               ALLERGIES     Patient has no known allergies. FAMILYHISTORY     History reviewed. No pertinent family history.        SOCIAL HISTORY       Social History     Tobacco Use    Smoking status: Never    Smokeless tobacco: Never   Substance Use Topics    Alcohol use: No    Drug use: Yes     Types: Marijuana (Weed)       SCREENINGS    [place] Coma Scale  Eye Opening: Spontaneous  Best Verbal Response: Oriented  Best Motor Response: Obeys commands  Richie Coma Scale Score: 15        PHYSICAL EXAM    (up to 7 for level 4, 8 or more for level 5)     ED Triage Vitals [08/29/22 1650]   BP Temp Temp Source Heart Rate Resp SpO2 Height Weight   (!) 175/114 98.2 °F (36.8 °C) Oral (!) 111 16 100 % 5' 3\" (1.6 m) 240 lb (108.9 kg)       Physical Exam  Constitutional:       General: She is not in acute distress. Appearance: Normal appearance. She is well-developed. She is not ill-appearing, toxic-appearing or diaphoretic. HENT:      Head: Normocephalic and atraumatic. Right Ear: External ear normal.      Left Ear: External ear normal.   Eyes:      General:         Right eye: No discharge. Left eye: No discharge. Cardiovascular:      Rate and Rhythm: Regular rhythm. Tachycardia present. Pulses: Normal pulses. Heart sounds: Normal heart sounds. No murmur heard. No friction rub. No gallop. Comments: Tachycardia noted. 2+ radial pulses bilaterally. No pedal edema. No calf tenderness. No JVD. Pulmonary:      Effort: Pulmonary effort is normal. No respiratory distress. Breath sounds: No stridor. Rhonchi present. No wheezing or rales. Comments: Reproducible tenderness palpation of the left-sided chest wall/rib cage. No crepitus or step-off. Chest:      Chest wall: Tenderness present. Abdominal:      General: Abdomen is flat. Bowel sounds are normal. There is no distension. Palpations: Abdomen is soft. There is no mass. Tenderness: There is abdominal tenderness in the left upper quadrant. There is no right CVA tenderness, left CVA tenderness, guarding or rebound. Negative signs include Escobar's sign and McBurney's sign.       Hernia: No hernia is present. Musculoskeletal:         General: Normal range of motion. Cervical back: Normal range of motion and neck supple. No rigidity or tenderness. Lymphadenopathy:      Cervical: No cervical adenopathy. Skin:     General: Skin is warm and dry. Coloration: Skin is not pale. Findings: No erythema or rash. Neurological:      Mental Status: She is alert and oriented to person, place, and time. Psychiatric:         Behavior: Behavior normal.       DIAGNOSTIC RESULTS   LABS:    Labs Reviewed   CBC WITH AUTO DIFFERENTIAL - Abnormal; Notable for the following components:       Result Value    Hemoglobin 10.9 (*)     Hematocrit 35.0 (*)     MCV 79.6 (*)     MCH 24.8 (*)     RDW 18.1 (*)     Platelets 072 (*)     All other components within normal limits   COMPREHENSIVE METABOLIC PANEL W/ REFLEX TO MG FOR LOW K - Abnormal; Notable for the following components:    Albumin/Globulin Ratio 1.0 (*)     All other components within normal limits   URINALYSIS WITH REFLEX TO CULTURE - Abnormal; Notable for the following components:    Clarity, UA CLOUDY (*)     Leukocyte Esterase, Urine TRACE (*)     All other components within normal limits   MICROSCOPIC URINALYSIS - Abnormal; Notable for the following components:    Bacteria, UA 3+ (*)     WBC, UA 11 (*)     Epithelial Cells, UA 23 (*)     All other components within normal limits   CULTURE, URINE   LIPASE   TROPONIN   BRAIN NATRIURETIC PEPTIDE   HCG, SERUM, QUALITATIVE       When ordered only abnormal lab results are displayed. All other labs were within normal range or not returned as of this dictation. EKG: When ordered, EKG's are interpreted by the Emergency Department Physician in the absence of a cardiologist.  Please see their note for interpretation of EKG.     RADIOLOGY:   Non-plain film images such as CT, Ultrasound and MRI are read by the radiologist. Plain radiographic images are visualized and preliminarily interpreted by the ED Provider with the below findings:        Interpretation per the Radiologist below, if available at the time of this note:    CT ABDOMEN PELVIS W IV CONTRAST Additional Contrast? None   Final Result   CT PA:      No evidence of pulmonary embolism or aortic dissection. Diffuse mural thickening of the esophagus, compatible with esophagitis. The   esophagus is also patulous, and therefore please correlate with clinical   evidence of dysphagia. Abdomen and pelvis CT:      No acute abnormalities are identified. Endometrial canal measures 8 mm. That would be within normal limits for the   patient is premenopausal.  However, if the patient is postmenopausal, that   would be considered thickened, and further evaluation with ultrasound would   be recommended. Fibroid uterus. Multiple hypodense lesions within the spleen, similar going back to at least   July 2020, most likely small cysts or hemangiomas. CT CHEST PULMONARY EMBOLISM W CONTRAST   Final Result   CT PA:      No evidence of pulmonary embolism or aortic dissection. Diffuse mural thickening of the esophagus, compatible with esophagitis. The   esophagus is also patulous, and therefore please correlate with clinical   evidence of dysphagia. Abdomen and pelvis CT:      No acute abnormalities are identified. Endometrial canal measures 8 mm. That would be within normal limits for the   patient is premenopausal.  However, if the patient is postmenopausal, that   would be considered thickened, and further evaluation with ultrasound would   be recommended. Fibroid uterus. Multiple hypodense lesions within the spleen, similar going back to at least   July 2020, most likely small cysts or hemangiomas. No results found.         PROCEDURES   Unless otherwise noted below, none     Procedures    CRITICAL CARE TIME       CONSULTS:  None      EMERGENCY DEPARTMENT COURSE and DIFFERENTIAL DIAGNOSIS/MDM:   Vitals: Vitals:    08/29/22 1650 08/29/22 1833   BP: (!) 175/114    Pulse: (!) 111    Resp: 16    Temp: 98.2 °F (36.8 °C)    TempSrc: Oral    SpO2: 100% 98%   Weight: 240 lb (108.9 kg)    Height: 5' 3\" (1.6 m)        Patient was given the following medications:  Medications   ipratropium-albuterol (DUONEB) nebulizer solution 1 ampule (1 ampule Inhalation Given 8/29/22 1833)   dexamethasone (PF) (DECADRON) injection 10 mg (10 mg Oral Given 8/29/22 1721)   iopamidol (ISOVUE-370) 76 % injection 75 mL (75 mLs IntraVENous Given 8/29/22 1804)         Is this patient to be included in the SEP-1 Core Measure due to severe sepsis or septic shock? No   Exclusion criteria - the patient is NOT to be included for SEP-1 Core Measure due to: Infection is not suspected    Patient is a 51-year-old female who presents to the ED with complaint of a cough. Patient complaint of cough for the past 2 weeks with now left-sided rib pain. Hurts when she takes a deep breath. IV was established and blood work obtained. Urinalysis showed 3+ bacteria with 11 white blood cells and 23 epithelial cells. She denies any urinary symptoms and believe most likely secondary to contamination. We will send for culture. Pregnancy is negative. CBC showed normal white count with platelets of 891 and hemoglobin of 10.9. CMP unremarkable. Lipase normal.  Troponin normal.  BNP unremarkable. EKG interpreted by attending. CT of the chest abdomen pelvis obtained and showed no pulmonary embolism or aortic dissection. There is thickening to the esophagus compatible with esophagitis. CT of the abdomen pelvis showed no acute abnormality. Endometrial canal measures 8 mm. She states she is perimenopausal.  Patient states her last period was 3 months ago but states before that was having regular periods. Informed of findings here in the ED and need for close outpatient follow-up with OB/GYN. She understands.   Otherwise relatively unremarkable CT scan except for some hypodense lesions similar to prior likely small cysts or hemangiomas. Suffering from cough with left-sided rib pain which I believe is most likely musculoskeletal from strain/coughing. Patient will be given cough medication for home. We will give albuterol and steroids. Follow-up with PCP. Return to ED for new or worsening symptoms. Patient feeling better after Decadron and DuoNeb treatment here in the ED. Patient's heart rate slightly elevated at 111 upon arrival which he states is chronic. States heart rate normally in the low 100s to 1 teens. She states this is normal for her. Therefore given her chronicity of heart rate in the 100s do not believe further imaging or work-up indicated this time. Did offer fluids but patient states despite fluids it normally stays around this rate and therefore she deferred. We will discharge home with close outpatient follow-up. Low suspicion for sepsis or severe infection/bacteremia. FINAL IMPRESSION      1. Cough    2. Rib pain on left side    3. Thickened endometrium          DISPOSITION/PLAN   DISPOSITION Decision To Discharge 08/29/2022 08:33:12 PM      PATIENT REFERRED TO:  Joss Longoria MD  [address] [place] [address] [place], [place] [id number] [address]  [phone]    Schedule an appointment as soon as possible for a visit   For a Re-check in  3-5    days. [facility] Emergency Department  [address]  [address] [facility] [address]  [phone]  Go to   As needed, If symptoms worsen    DISCHARGE MEDICATIONS:  Discharge Medication List as of 8/29/2022  8:37 PM        START taking these medications    Details   guaiFENesin-codeine (TUSSI-ORGANIDIN NR) 100-10 MG/5ML syrup Take 5 mLs by mouth 3 times daily as needed for Cough for up to 5 days. , Disp-75 mL, R-0Print      predniSONE (DELTASONE) 10 MG tablet Take 6 tablets by mouth daily for 5 doses, Disp-30 tablet, R-0Print             DISCONTINUED MEDICATIONS:  Discharge Medication List as of 8/29/2022  8:37 PM        STOP taking these medications       ibuprofen (ADVIL;MOTRIN) 600 MG tablet Comments:   Reason for Stopping:                      (Please note that portions of this note were completed with a voice recognition program.  Efforts were made to edit the dictations but occasionally words are mis-transcribed.)    MACKENZIE Falcon (electronically signed)          MACKENZIE Stephenson  08/29/22 0363

## 2022-08-30 LAB
EKG ATRIAL RATE: 111 BPM
EKG DIAGNOSIS: NORMAL
EKG P AXIS: 58 DEGREES
EKG P-R INTERVAL: 124 MS
EKG Q-T INTERVAL: 344 MS
EKG QRS DURATION: 70 MS
EKG QTC CALCULATION (BAZETT): 467 MS
EKG R AXIS: -10 DEGREES
EKG T AXIS: 18 DEGREES
EKG VENTRICULAR RATE: 111 BPM
URINE CULTURE, ROUTINE: NORMAL

## 2022-08-30 PROCEDURE — 93010 ELECTROCARDIOGRAM REPORT: CPT | Performed by: INTERNAL MEDICINE

## 2022-08-30 NOTE — ED PROVIDER NOTES
EKG:  Read by me in the absence of a cardiologist shows: Sinus tachycardia, rate 111, QRS duration normal, axis -10, no acute injury pattern, similar appearance to last EKG from 3 Jessica 2022    I did not perform face-to-face evaluation and was not otherwise involved in this patient's care. Please see PA note for further information on this visit.          Bon Phoenix MD  08/29/22 2027

## 2024-02-18 ENCOUNTER — HOSPITAL ENCOUNTER (EMERGENCY)
Age: 54
Discharge: HOME | End: 2024-02-18
Attending: EMERGENCY MEDICINE
Payer: MEDICAID

## 2024-02-18 VITALS
SYSTOLIC BLOOD PRESSURE: 178 MMHG | HEART RATE: 110 BPM | TEMPERATURE: 98.7 F | BODY MASS INDEX: 37.03 KG/M2 | HEIGHT: 63 IN | WEIGHT: 209 LBS | RESPIRATION RATE: 18 BRPM | OXYGEN SATURATION: 96 % | DIASTOLIC BLOOD PRESSURE: 79 MMHG

## 2024-02-18 DIAGNOSIS — J01.01 ACUTE RECURRENT MAXILLARY SINUSITIS: Primary | ICD-10-CM

## 2024-02-18 DIAGNOSIS — R09.81 NASAL CONGESTION: ICD-10-CM

## 2024-02-18 PROCEDURE — 99283 EMERGENCY DEPT VISIT LOW MDM: CPT

## 2024-02-18 RX ORDER — LORATADINE 10 MG/1
10 TABLET ORAL DAILY
Qty: 30 TABLET | Refills: 0 | Status: SHIPPED | OUTPATIENT
Start: 2024-02-18 | End: 2024-03-19

## 2024-02-18 ASSESSMENT — PAIN - FUNCTIONAL ASSESSMENT: PAIN_FUNCTIONAL_ASSESSMENT: NONE - DENIES PAIN

## 2024-02-19 NOTE — DISCHARGE INSTRUCTIONS
Follow up with your primary care provider in one week for a recheck    Take medications as prescribed.    Return to the ED if you have any worsening symptoms.     Follow up with ENT for a recheck    You can also take Coricidin HBP    Consider continuing nasal spray with Flonase or Nasacort.

## 2024-02-19 NOTE — ED NOTES
2/19/24  Pt contacted re: ED visit 2/18/24; \"doing OK\", encouraged to return if any problems/concerns.

## 2024-02-19 NOTE — ED PROVIDER NOTES
[facility] EMERGENCY DEPARTMENT  EMERGENCY DEPARTMENT ENCOUNTER        Pt Name: Chemo Hodgson  MRN: [id number]  Birthdate 1970  Date of evaluation: 2/18/2024  Provider: MACKENZIE Swanson  PCP: Adrienne Forde MD  Note Started: 7:37 PM EST 2/18/24      JACLYN. I have evaluated this patient.        CHIEF COMPLAINT       Chief Complaint   Patient presents with    Nasal Congestion     Pt presents to the ED with /co \"thick green mucous from left nose for about a month and a half.\" Pt states she went to urgent care and was prescribed 10 day atb and 5 day course of prednisone. No change in symptoms after first urgent care so Pt states she went to second urgent care and was given 5 day zpack and 5 day prednisone. Pt reports no change in mucus color or thickness.     Otalgia     Rt ear \"popping\".        HISTORY OF PRESENT ILLNESS: 1 or more Elements     History from : Patient    Limitations to history : None    Chemo Hodgson is a 53 y.o. female who presents to the ED with complaint of continue sinus and nasal drainage for the past 2 months. She has been on 2 rounds of antibiotics and steroids from an urgent care without any improvement. She denies fever or chills. She denies cough, shortness of breath, or chest pain. She states today she has noticed right ear pain. She states she has noticed her symptoms are worse when she is at home and since being here her symptoms have improved. She states she lives at a hotel. She also states she is exposed to different chemicals at her work.     Nursing Notes were all reviewed and agreed with or any disagreements were addressed in the HPI.    REVIEW OF SYSTEMS :      Review of Systems   Constitutional:  Negative for chills, diaphoresis and fever.   HENT:  Positive for congestion, ear pain, postnasal drip, rhinorrhea and sinus pressure. Negative for dental problem, drooling, ear discharge and sore throat.    Eyes:  Negative for pain and visual disturbance.

## 2024-03-14 ENCOUNTER — OFFICE VISIT (OUTPATIENT)
Dept: ENT CLINIC | Age: 54
End: 2024-03-14
Payer: MEDICAID

## 2024-03-14 VITALS
TEMPERATURE: 97.5 F | SYSTOLIC BLOOD PRESSURE: 177 MMHG | RESPIRATION RATE: 16 BRPM | HEART RATE: 75 BPM | OXYGEN SATURATION: 98 % | DIASTOLIC BLOOD PRESSURE: 97 MMHG | HEIGHT: 63 IN | BODY MASS INDEX: 36.5 KG/M2 | WEIGHT: 206 LBS

## 2024-03-14 DIAGNOSIS — J01.90 ACUTE RHINOSINUSITIS: Primary | ICD-10-CM

## 2024-03-14 DIAGNOSIS — J32.9 CHRONIC SINUSITIS, UNSPECIFIED LOCATION: ICD-10-CM

## 2024-03-14 PROCEDURE — 99203 OFFICE O/P NEW LOW 30 MIN: CPT | Performed by: OTOLARYNGOLOGY

## 2024-03-14 RX ORDER — PREDNISONE 10 MG/1
TABLET ORAL
Qty: 46 TABLET | Refills: 0 | Status: SHIPPED | OUTPATIENT
Start: 2024-03-14

## 2024-03-14 RX ORDER — CEFDINIR 300 MG/1
600 CAPSULE ORAL DAILY
Qty: 28 CAPSULE | Refills: 0 | Status: SHIPPED | OUTPATIENT
Start: 2024-03-14 | End: 2024-03-28

## 2024-03-14 NOTE — PATIENT INSTRUCTIONS
Please read and follow these instructions.  Please call the office and speak to the MA or LPN with any questions regarding these instructions.        RHINOSINUSITIS CARE  You may use acetaminophen (eg. Tylenol) or Ibuprofen (eg. Advil) (over the counter medications) as needed for fever or pain.  Take Probiotic while you are taking antibiotics, to prevent diarrhea, stomach upset, pseudomembranous colitis, and C. difficile diarrhea.  This may be obtained at your pharmacy or health food store.  Alternatively, you may eat one cup of yogurt with active or live cultures twice daily while taking the antibiotic.  Continue for two to three days after completion of the antibiotic.  Use a 12 hour decongestant spray, 0.05% oxymetazoline (e.g. Afrin, Duration, 4-Way).  Spray each nostril twice three times a day for three days, then two times a day for 2 days, and then stop for two days and then repeat the cycle once.  Take one plain Mucinex (blue box) 1200 mg maximum strength tablet (or two 600 mg regular strength tablets) twice daily.   Use a saline nasal/sinus irrigation system, e.g. NeilMed sinus rinse, twice daily to help to clear secretions and drainage.  Use distilled water; DO NOT USE TAP WATER!  This is because of the possibility of amoeba or other microorganisms in tap water, which can result in a fatal disease.  Alternatively, you could use a blue bulb syringe and solution of 1/4 tsp of table salt in 8 ounces (one cup or 240 ml) of distilled water.    Use fluticasone 2 sprays in each nostril once daily for the next 14 days.  It may take several days to several weeks for your sinusitis to clear up.  It is important to take all your medications as prescribed.  Please continue your antibiotics as prescribed.    Please call the office if your condition worsens or if symptoms persist after treatment is completed.          ===================================================================      ADVERSE AND SIDE EFFECTS OF

## 2024-03-14 NOTE — PROGRESS NOTES
Chemo was seen today for sinusitis.    Diagnoses and all orders for this visit:    Acute rhinosinusitis  -     cefdinir (OMNICEF) 300 MG capsule; Take 2 capsules by mouth daily for 14 days Take both capsules together at the same time, once daily.  -     predniSONE (DELTASONE) 10 MG tablet; by mouth, in morning, 6 tabs day 1-4, 4 tabs day 5-7, 2 tabs day 8-10, 1 tab day 11-13, then 0.5 tab day 14 and 16, then stop (skip day 15).    Chronic sinusitis, unspecified location  -     CT SINUS FOR IMAGE GUIDANCE; Future      RECOMMENDATIONS/PLAN      Patient was advised to review and follow my instructions in the AVS, which we reviewed together, and to call and speak to the MA or LPN with any questions regarding these instructions.  Prescription drug management: Prescribed cefdinir and prednisone.  Acetaminophen (Tylenol) or Ibuprofen (Advil, Motrin IB) or naprosyn (Aleve) (over the counter medications) as needed for fever or pain.  Return in about 4 weeks (around 4/11/2024) for recheck/follow up after CT sinus scan.        Oleksandr Gates MD    [facility]  Department of Otolaryngology/Head and Neck Surgery  [facility]  [address], [address]  [place] [address]  [phone]

## 2024-04-01 ENCOUNTER — HOSPITAL ENCOUNTER (OUTPATIENT)
Dept: CT IMAGING | Age: 54
Discharge: HOME | End: 2024-04-01
Attending: OTOLARYNGOLOGY
Payer: MEDICAID

## 2024-04-01 DIAGNOSIS — J32.9 CHRONIC SINUSITIS, UNSPECIFIED LOCATION: ICD-10-CM

## 2024-04-01 PROCEDURE — 70486 CT MAXILLOFACIAL W/O DYE: CPT

## 2024-04-11 ENCOUNTER — OFFICE VISIT (OUTPATIENT)
Dept: ENT CLINIC | Age: 54
End: 2024-04-11
Payer: MEDICAID

## 2024-04-11 VITALS
DIASTOLIC BLOOD PRESSURE: 88 MMHG | HEIGHT: 63 IN | BODY MASS INDEX: 36.5 KG/M2 | OXYGEN SATURATION: 96 % | WEIGHT: 206 LBS | HEART RATE: 95 BPM | SYSTOLIC BLOOD PRESSURE: 136 MMHG | TEMPERATURE: 97.5 F

## 2024-04-11 DIAGNOSIS — J32.2 CHRONIC ETHMOIDAL SINUSITIS: Chronic | ICD-10-CM

## 2024-04-11 DIAGNOSIS — J01.90 ACUTE RHINOSINUSITIS: Primary | ICD-10-CM

## 2024-04-11 DIAGNOSIS — J32.0 CHRONIC MAXILLARY SINUSITIS: Chronic | ICD-10-CM

## 2024-04-11 PROCEDURE — 99214 OFFICE O/P EST MOD 30 MIN: CPT | Performed by: OTOLARYNGOLOGY

## 2024-04-11 RX ORDER — DOXYCYCLINE HYCLATE 100 MG
100 TABLET ORAL 2 TIMES DAILY
Qty: 42 TABLET | Refills: 0 | Status: SHIPPED | OUTPATIENT
Start: 2024-04-11 | End: 2024-05-02

## 2024-04-11 RX ORDER — PREDNISONE 10 MG/1
TABLET ORAL
Qty: 46 TABLET | Refills: 0 | Status: SHIPPED | OUTPATIENT
Start: 2024-04-11

## 2024-04-11 NOTE — PROGRESS NOTES
sinusitis to clear up.  It is important to take all your medications as prescribed.  Please continue your antibiotics as prescribed.    Please call the office if your condition worsens or if symptoms persist after treatment is completed.          ===================================================================      ADVERSE AND SIDE EFFECTS OF MEDICATIONS:    Please be aware of the following possible adverse reactions, side effects, and complications of the following medications, including, but not limited to: allergic reaction, interactions with other medications, nausea, headache, diarrhea, persistent symptoms, failure to improve, and the following:     General side effects of antibiotic:  diarrhea, colitis (severe infection and inflammation of the large intestine), pseudomembranous colitis (severe infection and inflammation of the colon, usually due to C. difficile bacteria)  yeast or fungal  infections, Aaron-Dawson syndrome (very rare necrotic skin reaction with peeling of skin, blisters and arthritis), persistent symptoms/infection, and failure to improve.       Fluticasone:  nosebleed, burning sensation, atrophy of nasal mucosa, septal ulceration or perforation, nasal irritation, nasal yeast infection,  drowsiness, bad taste.        ~~~>>> Also please read the medication information in this AVS and all information given to you by the pharmacist.             Oleksandr Gates MD    [facility]  Department of Otolaryngology/Head and Neck Surgery  [facility]  [address], [address]  [place] [address]  [phone]

## 2024-04-11 NOTE — PATIENT INSTRUCTIONS
MEDICATIONS:    Please be aware of the following possible adverse reactions, side effects, and complications of the following medications, including, but not limited to: allergic reaction, interactions with other medications, nausea, headache, diarrhea, persistent symptoms, failure to improve, and the following:     General side effects of antibiotic:  diarrhea, colitis (severe infection and inflammation of the large intestine), pseudomembranous colitis (severe infection and inflammation of the colon, usually due to C. difficile bacteria)  yeast or fungal  infections, Aaron-Dawson syndrome (very rare necrotic skin reaction with peeling of skin, blisters and arthritis), persistent symptoms/infection, and failure to improve.       Fluticasone:  nosebleed, burning sensation, atrophy of nasal mucosa, septal ulceration or perforation, nasal irritation, nasal yeast infection,  drowsiness, bad taste.        ~~~>>> Also please read the medication information in this AVS and all information given to you by the pharmacist.

## 2024-05-03 ENCOUNTER — HOSPITAL ENCOUNTER (OUTPATIENT)
Dept: CT IMAGING | Age: 54
Discharge: HOME | End: 2024-05-03
Attending: OTOLARYNGOLOGY
Payer: MEDICAID

## 2024-05-03 DIAGNOSIS — J32.2 CHRONIC ETHMOIDAL SINUSITIS: Chronic | ICD-10-CM

## 2024-05-03 DIAGNOSIS — J32.0 CHRONIC MAXILLARY SINUSITIS: Chronic | ICD-10-CM

## 2024-05-03 DIAGNOSIS — J01.90 ACUTE RHINOSINUSITIS: ICD-10-CM

## 2024-05-03 PROCEDURE — 70486 CT MAXILLOFACIAL W/O DYE: CPT

## 2024-05-15 ENCOUNTER — OFFICE VISIT (OUTPATIENT)
Dept: ENT CLINIC | Age: 54
End: 2024-05-15
Payer: MEDICAID

## 2024-05-15 VITALS
OXYGEN SATURATION: 98 % | HEIGHT: 63 IN | HEART RATE: 97 BPM | WEIGHT: 208 LBS | TEMPERATURE: 98.8 F | DIASTOLIC BLOOD PRESSURE: 82 MMHG | BODY MASS INDEX: 36.86 KG/M2 | SYSTOLIC BLOOD PRESSURE: 133 MMHG

## 2024-05-15 DIAGNOSIS — J32.2 CHRONIC ETHMOIDAL SINUSITIS: ICD-10-CM

## 2024-05-15 DIAGNOSIS — J32.0 CHRONIC MAXILLARY SINUSITIS: Primary | ICD-10-CM

## 2024-05-15 PROCEDURE — 99214 OFFICE O/P EST MOD 30 MIN: CPT | Performed by: OTOLARYNGOLOGY

## 2024-05-15 NOTE — PROGRESS NOTES
CHIEF COMPLAINT  Chief Complaint   Patient presents with    Sinusitis       HISTORY OF PRESENT ILLNESS     Chemo Hodgson is a 54 y.o. female here for recheck and follow up of sinusitis.  Patient stated that sinuses are about the same.      REVIEW OF SYSTEMS  Constitutional:  Denied fever and chills.  ENT/sinus:  Denied otalgia, otorrhea, nasal pain, rhinorrhea, sore throat, and sinus/facial pain.        EXAMINATION    WDWN, NAD  Face:  Normal skin with no lesions detected.    Voice:  Normal, with no hoarseness, breathiness, or hot potato quality.  Ears:  the TMs and EACs were normal.  The mastoids and pinnae were normal.    Nose:  NSD.   Sinuses: Nontender x 4   Throat,  OC/OP:  Tonsils 3+ bilaterally.  Otherwise, normal with no lesions.  Neck:  NT, No masses.  Trachea midline.  Nodes:  No lymphadenopathy.     I performed this head and neck physical exam personally.      REVIEW OF IMAGES:         I  independently interpreted the images of the CT scan of the sinuses, showing chronic maxillary and ethmoid sinusitis.        CT OF THE SINUS WITHOUT CONTRAST 5/3/2024        FINDINGS:  The frontal sinuses are hypoplastic bilaterally.  The frontoethmoidal  recesses are under developed.     The anterior ethmoidal air cells are near completely opacified bilaterally..  Dehiscence of the right lamina per pre she appeared.  The posterior ethmoidal  air cells are completely opacified on the right, clear on the left.  The  anterior ethmoid arteries are covered..     The maxillary sinuses are completely opacified on the left, partially  opacified on the right.  The uncinate processes are normal. The maxillary  infundibulum aredemonstrate mucosal coaptation on the left, patent on the  right.  The osteomeatal units are patent on the right, partially obstructed  on the left.     The sphenoethmoidal recesses are patent. The sphenoid sinuses are clear..     Sinus variants: Thinning of the carotid plate bilaterally without

## 2024-06-17 ENCOUNTER — PREP FOR PROCEDURE (OUTPATIENT)
Dept: ENT CLINIC | Age: 54
End: 2024-06-17

## 2024-06-17 DIAGNOSIS — J32.0 CHRONIC MAXILLARY SINUSITIS: ICD-10-CM

## 2024-06-17 DIAGNOSIS — J32.2 CHRONIC ETHMOIDAL SINUSITIS: ICD-10-CM

## 2024-08-01 ENCOUNTER — OFFICE VISIT (OUTPATIENT)
Dept: ENT CLINIC | Age: 54
End: 2024-08-01
Payer: MEDICAID

## 2024-08-01 VITALS
DIASTOLIC BLOOD PRESSURE: 88 MMHG | HEIGHT: 63 IN | HEART RATE: 115 BPM | BODY MASS INDEX: 36.86 KG/M2 | TEMPERATURE: 98.4 F | OXYGEN SATURATION: 96 % | WEIGHT: 208 LBS | SYSTOLIC BLOOD PRESSURE: 132 MMHG

## 2024-08-01 DIAGNOSIS — J32.2 CHRONIC ETHMOIDAL SINUSITIS: Chronic | ICD-10-CM

## 2024-08-01 DIAGNOSIS — J32.0 CHRONIC MAXILLARY SINUSITIS: Primary | Chronic | ICD-10-CM

## 2024-08-01 PROCEDURE — 99213 OFFICE O/P EST LOW 20 MIN: CPT | Performed by: OTOLARYNGOLOGY

## 2024-08-01 NOTE — PROGRESS NOTES
CHIEF COMPLAINT  Chief Complaint   Patient presents with    Sinusitis       HISTORY OF PRESENT ILLNESS     Chemo Hodgson is a 54 y.o. female here for recheck and follow up of the above chief complaint.  Patient stated that  \"I haven't had none of those symptoms.  Except I can still smell the mucus.  Not all the time neither.\"  Nasal mucus is all clear and colorless, watery.  She \"took a course of prednisone for 7 more days per [facility] doctor, for a skin condition.\"    Had sinus infection about one month ago which was after last visit here.  I took Mucinex , OTC sinus medicine, Flonase.  It just went away after 4 days.        REVIEW OF SYSTEMS  Constitutional:  Denied fever.  ENT/sinus:  Denied otalgia, otorrhea, nasal pain, rhinorrhea, sore throat, and sinus/facial pain.        EXAMINATION    Vitals:    08/01/24 1550   BP: 132/88   Site: Left Upper Arm   Position: Sitting   Cuff Size: Large Adult   Pulse: (!) 115   Temp: 98.4 °F (36.9 °C)   TempSrc: Infrared   SpO2: 96%   Weight: 94.3 kg (208 lb)   Height: 1.6 m (5' 2.99\")     WDWN, NAD  Face:  Normal skin.    Voice:  Normal, with no hoarseness, breathiness, or hot potato quality.  Ears:   TMs and EACs were normal.  The mastoids and pinnae were normal.    Nose:  Normal.    Sinuses: Nontender x 4   Throat,  OC/OP:  Normal.    Neck:  NT, No masses.  Trachea midline.    Nodes:  No lymphadenopathy.     Thyroid:  Normal       I performed this physical exam personally.        IMPRESSION / DIAGNOSES / ORDERS:   Chemo was seen today for sinusitis.    Diagnoses and all orders for this visit:    Chronic maxillary sinusitis  Comments:  Improved/resolved.    Chronic ethmoidal sinusitis  Comments:  Improved/resolved.         RECOMMENDATIONS / PLAN:   Decision regarding elective major surgery:  we have decided not to proceed with FESS at this time.  She agreed to return for any further episodes of sinusitis.  Return for any further ENT or sinus problems or symptoms.